# Patient Record
Sex: FEMALE | Race: BLACK OR AFRICAN AMERICAN | Employment: UNEMPLOYED | URBAN - METROPOLITAN AREA
[De-identification: names, ages, dates, MRNs, and addresses within clinical notes are randomized per-mention and may not be internally consistent; named-entity substitution may affect disease eponyms.]

---

## 2018-03-18 ENCOUNTER — APPOINTMENT (OUTPATIENT)
Dept: GENERAL RADIOLOGY | Age: 33
End: 2018-03-18
Payer: MEDICAID

## 2018-03-18 ENCOUNTER — HOSPITAL ENCOUNTER (EMERGENCY)
Age: 33
Discharge: HOME | End: 2018-03-18
Attending: EMERGENCY MEDICINE
Payer: MEDICAID

## 2018-03-18 VITALS
DIASTOLIC BLOOD PRESSURE: 85 MMHG | WEIGHT: 180 LBS | TEMPERATURE: 99.2 F | OXYGEN SATURATION: 96 % | HEIGHT: 66 IN | SYSTOLIC BLOOD PRESSURE: 141 MMHG | RESPIRATION RATE: 18 BRPM | BODY MASS INDEX: 28.93 KG/M2 | HEART RATE: 106 BPM

## 2018-03-18 DIAGNOSIS — J11.1 INFLUENZA WITH RESPIRATORY MANIFESTATION OTHER THAN PNEUMONIA: Primary | ICD-10-CM

## 2018-03-18 LAB
ALBUMIN SERPL-MCNC: 4 G/DL (ref 3.5–5.2)
ALP BLD-CCNC: 63 U/L (ref 35–104)
ALT SERPL-CCNC: 54 U/L (ref 0–32)
ANION GAP SERPL CALCULATED.3IONS-SCNC: 12 MMOL/L (ref 7–16)
AST SERPL-CCNC: 102 U/L (ref 0–31)
BACTERIA: ABNORMAL /HPF
BILIRUB SERPL-MCNC: 0.4 MG/DL (ref 0–1.2)
BILIRUBIN URINE: NEGATIVE
BLOOD, URINE: NEGATIVE
BUN BLDV-MCNC: 6 MG/DL (ref 6–20)
CALCIUM SERPL-MCNC: 8.9 MG/DL (ref 8.6–10.2)
CHLORIDE BLD-SCNC: 98 MMOL/L (ref 98–107)
CHP ED QC CHECK: NORMAL
CLARITY: CLEAR
CO2: 25 MMOL/L (ref 22–29)
COLOR: YELLOW
CREAT SERPL-MCNC: 0.9 MG/DL (ref 0.5–1)
EPITHELIAL CELLS, UA: ABNORMAL /HPF
GFR AFRICAN AMERICAN: >60
GFR NON-AFRICAN AMERICAN: >60 ML/MIN/1.73
GLUCOSE BLD-MCNC: 111 MG/DL (ref 74–109)
GLUCOSE URINE: NEGATIVE MG/DL
HCT VFR BLD CALC: 37.1 % (ref 34–48)
HEMOGLOBIN: 12.1 G/DL (ref 11.5–15.5)
INFLUENZA A BY PCR: NOT DETECTED
INFLUENZA B BY PCR: DETECTED
KETONES, URINE: NEGATIVE MG/DL
LEUKOCYTE ESTERASE, URINE: ABNORMAL
LIPASE: 19 U/L (ref 13–60)
MCH RBC QN AUTO: 27.1 PG (ref 26–35)
MCHC RBC AUTO-ENTMCNC: 32.6 % (ref 32–34.5)
MCV RBC AUTO: 83.2 FL (ref 80–99.9)
NITRITE, URINE: NEGATIVE
PDW BLD-RTO: 18.1 FL (ref 11.5–15)
PH UA: 6.5 (ref 5–9)
PLATELET # BLD: 288 E9/L (ref 130–450)
PMV BLD AUTO: 9 FL (ref 7–12)
POTASSIUM SERPL-SCNC: 4.3 MMOL/L (ref 3.5–5)
PREGNANCY TEST URINE, POC: NEGATIVE
PROTEIN UA: NEGATIVE MG/DL
RBC # BLD: 4.46 E12/L (ref 3.5–5.5)
RBC UA: ABNORMAL /HPF (ref 0–2)
SODIUM BLD-SCNC: 135 MMOL/L (ref 132–146)
SPECIFIC GRAVITY UA: 1.01 (ref 1–1.03)
TOTAL PROTEIN: 7.8 G/DL (ref 6.4–8.3)
UROBILINOGEN, URINE: 1 E.U./DL
WBC # BLD: 3.7 E9/L (ref 4.5–11.5)
WBC UA: ABNORMAL /HPF (ref 0–5)

## 2018-03-18 PROCEDURE — 96374 THER/PROPH/DIAG INJ IV PUSH: CPT

## 2018-03-18 PROCEDURE — 99283 EMERGENCY DEPT VISIT LOW MDM: CPT

## 2018-03-18 PROCEDURE — 83690 ASSAY OF LIPASE: CPT

## 2018-03-18 PROCEDURE — 85027 COMPLETE CBC AUTOMATED: CPT

## 2018-03-18 PROCEDURE — 80053 COMPREHEN METABOLIC PANEL: CPT

## 2018-03-18 PROCEDURE — 81001 URINALYSIS AUTO W/SCOPE: CPT

## 2018-03-18 PROCEDURE — 87088 URINE BACTERIA CULTURE: CPT

## 2018-03-18 PROCEDURE — 6360000002 HC RX W HCPCS: Performed by: EMERGENCY MEDICINE

## 2018-03-18 PROCEDURE — 87502 INFLUENZA DNA AMP PROBE: CPT

## 2018-03-18 PROCEDURE — 2580000003 HC RX 258: Performed by: EMERGENCY MEDICINE

## 2018-03-18 PROCEDURE — 71045 X-RAY EXAM CHEST 1 VIEW: CPT

## 2018-03-18 RX ORDER — 0.9 % SODIUM CHLORIDE 0.9 %
1000 INTRAVENOUS SOLUTION INTRAVENOUS ONCE
Status: COMPLETED | OUTPATIENT
Start: 2018-03-18 | End: 2018-03-18

## 2018-03-18 RX ORDER — KETOROLAC TROMETHAMINE 30 MG/ML
30 INJECTION, SOLUTION INTRAMUSCULAR; INTRAVENOUS ONCE
Status: COMPLETED | OUTPATIENT
Start: 2018-03-18 | End: 2018-03-18

## 2018-03-18 RX ORDER — OSELTAMIVIR PHOSPHATE 75 MG/1
75 CAPSULE ORAL 2 TIMES DAILY
Qty: 10 CAPSULE | Refills: 0 | Status: SHIPPED | OUTPATIENT
Start: 2018-03-18 | End: 2018-03-23

## 2018-03-18 RX ADMIN — SODIUM CHLORIDE 1000 ML: 9 INJECTION, SOLUTION INTRAVENOUS at 10:26

## 2018-03-18 RX ADMIN — KETOROLAC TROMETHAMINE 30 MG: 30 INJECTION, SOLUTION INTRAMUSCULAR; INTRAVENOUS at 12:43

## 2018-03-18 ASSESSMENT — PAIN DESCRIPTION - DESCRIPTORS: DESCRIPTORS: ACHING

## 2018-03-18 ASSESSMENT — PAIN DESCRIPTION - PAIN TYPE: TYPE: ACUTE PAIN

## 2018-03-18 ASSESSMENT — PAIN SCALES - GENERAL
PAINLEVEL_OUTOF10: 3
PAINLEVEL_OUTOF10: 7
PAINLEVEL_OUTOF10: 4

## 2018-03-18 NOTE — ED PROVIDER NOTES
Chloride 98 98 - 107 mmol/L    CO2 25 22 - 29 mmol/L    Anion Gap 12 7 - 16 mmol/L    Glucose 111 (H) 74 - 109 mg/dL    BUN 6 6 - 20 mg/dL    CREATININE 0.9 0.5 - 1.0 mg/dL    GFR Non-African American >60 >=60 mL/min/1.73    GFR African American >60     Calcium 8.9 8.6 - 10.2 mg/dL    Total Protein 7.8 6.4 - 8.3 g/dL    Alb 4.0 3.5 - 5.2 g/dL    Total Bilirubin 0.4 0.0 - 1.2 mg/dL    Alkaline Phosphatase 63 35 - 104 U/L    ALT 54 (H) 0 - 32 U/L     (H) 0 - 31 U/L   Lipase   Result Value Ref Range    Lipase 19 13 - 60 U/L   Microscopic Urinalysis   Result Value Ref Range    WBC, UA 0-1 0 - 5 /HPF    RBC, UA NONE 0 - 2 /HPF    Epi Cells RARE /HPF    Bacteria, UA RARE (A) /HPF   POC Pregnancy Urine Qual   Result Value Ref Range    Preg Test, Ur negative     QC OK? ok        RADIOLOGY:  Interpreted by Radiologist.  XR CHEST PORTABLE   Final Result   Bibasilar atelectasis and/or interstitial edema. Otherwise no acute   process identified. EKG:      ------------------------- NURSING NOTES AND VITALS REVIEWED ---------------------------   The nursing notes within the ED encounter and vital signs as below have been reviewed by myself. BP (!) 141/85   Pulse 106   Temp 99.2 °F (37.3 °C) (Oral)   Resp 18   Ht 5' 6\" (1.676 m)   Wt 180 lb (81.6 kg)   LMP 02/22/2018 (Approximate)   SpO2 96%   BMI 29.05 kg/m²   Oxygen Saturation Interpretation: Normal    The patients available past medical records and past encounters were reviewed. ------------------------------ ED COURSE/MEDICAL DECISION MAKING----------------------  Medications   0.9 % sodium chloride bolus (1,000 mLs Intravenous New Bag 3/18/18 1026)       Medical Decision Making:    Pt presents for cough and generalized body aches. IV fluids given. Labs and imaging obtained, reviewed; results discussed with patient. Patient family updated on all diagnostics. Patient discharged home on Tamiflu for influenza.     This patient's ED course included: a

## 2018-03-20 LAB — URINE CULTURE, ROUTINE: NORMAL

## 2018-04-05 ENCOUNTER — APPOINTMENT (OUTPATIENT)
Dept: GENERAL RADIOLOGY | Age: 33
DRG: 342 | End: 2018-04-05
Payer: MEDICAID

## 2018-04-05 ENCOUNTER — HOSPITAL ENCOUNTER (INPATIENT)
Age: 33
LOS: 1 days | Discharge: HOME | DRG: 342 | End: 2018-04-06
Attending: EMERGENCY MEDICINE | Admitting: SURGERY
Payer: MEDICAID

## 2018-04-05 ENCOUNTER — APPOINTMENT (OUTPATIENT)
Dept: CT IMAGING | Age: 33
DRG: 342 | End: 2018-04-05
Payer: MEDICAID

## 2018-04-05 ENCOUNTER — APPOINTMENT (OUTPATIENT)
Dept: MRI IMAGING | Age: 33
DRG: 342 | End: 2018-04-05
Payer: MEDICAID

## 2018-04-05 DIAGNOSIS — S62.645B NONDISPLACED FRACTURE OF PROXIMAL PHALANX OF LEFT RING FINGER, INITIAL ENCOUNTER FOR OPEN FRACTURE: ICD-10-CM

## 2018-04-05 DIAGNOSIS — S62.665B NONDISPLACED FRACTURE OF DISTAL PHALANX OF LEFT RING FINGER, INITIAL ENCOUNTER FOR OPEN FRACTURE: Primary | ICD-10-CM

## 2018-04-05 DIAGNOSIS — T14.90XA TRAUMA: ICD-10-CM

## 2018-04-05 DIAGNOSIS — S62.663A: ICD-10-CM
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
## 2018-04-05 LAB
ABO/RH: NORMAL
ACETAMINOPHEN LEVEL: <15 MCG/ML (ref 10–30)
ALBUMIN SERPL-MCNC: 3.8 G/DL (ref 3.5–5.2)
ALP BLD-CCNC: 58 U/L (ref 35–104)
ALT SERPL-CCNC: 25 U/L (ref 0–32)
ANION GAP SERPL CALCULATED.3IONS-SCNC: 15 MMOL/L (ref 7–16)
ANTIBODY SCREEN: NORMAL
APTT: 27.6 SEC (ref 24.5–35.1)
AST SERPL-CCNC: 30 U/L (ref 0–31)
B.E.: -3.8 MMOL/L (ref -3–3)
BILIRUB SERPL-MCNC: 0.3 MG/DL (ref 0–1.2)
BUN BLDV-MCNC: 7 MG/DL (ref 6–20)
CALCIUM SERPL-MCNC: 8.7 MG/DL (ref 8.6–10.2)
CHLORIDE BLD-SCNC: 101 MMOL/L (ref 98–107)
CO2: 24 MMOL/L (ref 22–29)
COHB: 0.4 % (ref 0–1.5)
CREAT SERPL-MCNC: 1 MG/DL (ref 0.5–1)
CRITICAL: ABNORMAL
DATE ANALYZED: ABNORMAL
DATE OF COLLECTION: ABNORMAL
ETHANOL: 178 MG/DL (ref 0–0.08)
GFR AFRICAN AMERICAN: >60
GFR NON-AFRICAN AMERICAN: >60 ML/MIN/1.73
GLUCOSE BLD-MCNC: 126 MG/DL (ref 74–109)
GONADOTROPIN, CHORIONIC (HCG) QUANT: <0.1 MIU/ML
HCO3: 20.7 MMOL/L (ref 22–26)
HCT VFR BLD CALC: 34.4 % (ref 34–48)
HEMOGLOBIN: 11.5 G/DL (ref 11.5–15.5)
HHB: 0.2 % (ref 0–5)
INR BLD: 1
LAB: ABNORMAL
LACTIC ACID: 3 MMOL/L (ref 0.5–2.2)
Lab: 26
MCH RBC QN AUTO: 27.4 PG (ref 26–35)
MCHC RBC AUTO-ENTMCNC: 33.4 % (ref 32–34.5)
MCV RBC AUTO: 82.1 FL (ref 80–99.9)
METHB: 0.8 % (ref 0–1.5)
MODE: ABNORMAL
O2 CONTENT: 18.3 ML/DL
O2 SATURATION: 99.8 % (ref 92–98.5)
O2HB: 98.6 % (ref 94–97)
OPERATOR ID: ABNORMAL
PATIENT TEMP: 37 C
PCO2: 36.1 MMHG (ref 35–45)
PDW BLD-RTO: 17.8 FL (ref 11.5–15)
PH BLOOD GAS: 7.38 (ref 7.35–7.45)
PLATELET # BLD: 364 E9/L (ref 130–450)
PMV BLD AUTO: 9.1 FL (ref 7–12)
PO2: 415.5 MMHG (ref 60–100)
POTASSIUM SERPL-SCNC: 3.4 MMOL/L (ref 3.5–5)
POTASSIUM SERPL-SCNC: 3.56 MMOL/L (ref 3.3–5.1)
PROTHROMBIN TIME: 11.4 SEC (ref 9.3–12.4)
RBC # BLD: 4.19 E12/L (ref 3.5–5.5)
SALICYLATE, SERUM: <0.3 MG/DL (ref 0–30)
SODIUM BLD-SCNC: 140 MMOL/L (ref 132–146)
SOURCE, BLOOD GAS: ABNORMAL
THB: 12.4 G/DL (ref 11.5–16.5)
TIME ANALYZED: 33
TOTAL PROTEIN: 7.2 G/DL (ref 6.4–8.3)
TRICYCLIC ANTIDEPRESSANTS SCREEN SERUM: NEGATIVE NG/ML
WBC # BLD: 7 E9/L (ref 4.5–11.5)

## 2018-04-05 PROCEDURE — 6370000000 HC RX 637 (ALT 250 FOR IP): Performed by: NURSE PRACTITIONER

## 2018-04-05 PROCEDURE — 80307 DRUG TEST PRSMV CHEM ANLYZR: CPT

## 2018-04-05 PROCEDURE — 72170 X-RAY EXAM OF PELVIS: CPT

## 2018-04-05 PROCEDURE — 85027 COMPLETE CBC AUTOMATED: CPT

## 2018-04-05 PROCEDURE — 99285 EMERGENCY DEPT VISIT HI MDM: CPT

## 2018-04-05 PROCEDURE — 94150 VITAL CAPACITY TEST: CPT

## 2018-04-05 PROCEDURE — 86850 RBC ANTIBODY SCREEN: CPT

## 2018-04-05 PROCEDURE — 71045 X-RAY EXAM CHEST 1 VIEW: CPT

## 2018-04-05 PROCEDURE — G0390 TRAUMA RESPONS W/HOSP CRITI: HCPCS

## 2018-04-05 PROCEDURE — 71260 CT THORAX DX C+: CPT

## 2018-04-05 PROCEDURE — 73120 X-RAY EXAM OF HAND: CPT

## 2018-04-05 PROCEDURE — 84132 ASSAY OF SERUM POTASSIUM: CPT

## 2018-04-05 PROCEDURE — 96375 TX/PRO/DX INJ NEW DRUG ADDON: CPT

## 2018-04-05 PROCEDURE — 2580000003 HC RX 258: Performed by: STUDENT IN AN ORGANIZED HEALTH CARE EDUCATION/TRAINING PROGRAM

## 2018-04-05 PROCEDURE — 73090 X-RAY EXAM OF FOREARM: CPT

## 2018-04-05 PROCEDURE — 72128 CT CHEST SPINE W/O DYE: CPT

## 2018-04-05 PROCEDURE — 70450 CT HEAD/BRAIN W/O DYE: CPT

## 2018-04-05 PROCEDURE — 80053 COMPREHEN METABOLIC PANEL: CPT

## 2018-04-05 PROCEDURE — 86900 BLOOD TYPING SEROLOGIC ABO: CPT

## 2018-04-05 PROCEDURE — 73130 X-RAY EXAM OF HAND: CPT

## 2018-04-05 PROCEDURE — 6360000002 HC RX W HCPCS: Performed by: STUDENT IN AN ORGANIZED HEALTH CARE EDUCATION/TRAINING PROGRAM

## 2018-04-05 PROCEDURE — 2580000003 HC RX 258: Performed by: RADIOLOGY

## 2018-04-05 PROCEDURE — 1200000000 HC SEMI PRIVATE

## 2018-04-05 PROCEDURE — 74177 CT ABD & PELVIS W/CONTRAST: CPT

## 2018-04-05 PROCEDURE — 84702 CHORIONIC GONADOTROPIN TEST: CPT

## 2018-04-05 PROCEDURE — 96376 TX/PRO/DX INJ SAME DRUG ADON: CPT

## 2018-04-05 PROCEDURE — 85730 THROMBOPLASTIN TIME PARTIAL: CPT

## 2018-04-05 PROCEDURE — 83605 ASSAY OF LACTIC ACID: CPT

## 2018-04-05 PROCEDURE — 86901 BLOOD TYPING SEROLOGIC RH(D): CPT

## 2018-04-05 PROCEDURE — G0378 HOSPITAL OBSERVATION PER HR: HCPCS

## 2018-04-05 PROCEDURE — 73100 X-RAY EXAM OF WRIST: CPT

## 2018-04-05 PROCEDURE — 36415 COLL VENOUS BLD VENIPUNCTURE: CPT

## 2018-04-05 PROCEDURE — 72125 CT NECK SPINE W/O DYE: CPT

## 2018-04-05 PROCEDURE — 82805 BLOOD GASES W/O2 SATURATION: CPT

## 2018-04-05 PROCEDURE — 6370000000 HC RX 637 (ALT 250 FOR IP): Performed by: STUDENT IN AN ORGANIZED HEALTH CARE EDUCATION/TRAINING PROGRAM

## 2018-04-05 PROCEDURE — 85610 PROTHROMBIN TIME: CPT

## 2018-04-05 PROCEDURE — 6360000004 HC RX CONTRAST MEDICATION: Performed by: RADIOLOGY

## 2018-04-05 PROCEDURE — 96374 THER/PROPH/DIAG INJ IV PUSH: CPT

## 2018-04-05 PROCEDURE — 73070 X-RAY EXAM OF ELBOW: CPT

## 2018-04-05 RX ORDER — METHOCARBAMOL 500 MG/1
1000 TABLET, FILM COATED ORAL 4 TIMES DAILY
Status: DISCONTINUED | OUTPATIENT
Start: 2018-04-05 | End: 2018-04-06

## 2018-04-05 RX ORDER — ACETAMINOPHEN 325 MG/1
650 TABLET ORAL EVERY 4 HOURS PRN
Status: DISCONTINUED | OUTPATIENT
Start: 2018-04-05 | End: 2018-04-06 | Stop reason: HOSPADM

## 2018-04-05 RX ORDER — ONDANSETRON 2 MG/ML
4 INJECTION INTRAMUSCULAR; INTRAVENOUS EVERY 6 HOURS PRN
Status: DISCONTINUED | OUTPATIENT
Start: 2018-04-05 | End: 2018-04-06 | Stop reason: HOSPADM

## 2018-04-05 RX ORDER — SODIUM CHLORIDE 9 MG/ML
INJECTION, SOLUTION INTRAVENOUS CONTINUOUS
Status: DISCONTINUED | OUTPATIENT
Start: 2018-04-05 | End: 2018-04-05

## 2018-04-05 RX ORDER — MORPHINE SULFATE 2 MG/ML
2 INJECTION, SOLUTION INTRAMUSCULAR; INTRAVENOUS
Status: DISCONTINUED | OUTPATIENT
Start: 2018-04-05 | End: 2018-04-05

## 2018-04-05 RX ORDER — SODIUM CHLORIDE 0.9 % (FLUSH) 0.9 %
10 SYRINGE (ML) INJECTION EVERY 12 HOURS SCHEDULED
Status: DISCONTINUED | OUTPATIENT
Start: 2018-04-05 | End: 2018-04-06 | Stop reason: HOSPADM

## 2018-04-05 RX ORDER — SODIUM CHLORIDE 0.9 % (FLUSH) 0.9 %
10 SYRINGE (ML) INJECTION PRN
Status: COMPLETED | OUTPATIENT
Start: 2018-04-05 | End: 2018-04-05

## 2018-04-05 RX ORDER — LORAZEPAM 2 MG/ML
1 INJECTION INTRAMUSCULAR ONCE
Status: DISCONTINUED | OUTPATIENT
Start: 2018-04-05 | End: 2018-04-06 | Stop reason: HOSPADM

## 2018-04-05 RX ORDER — ONDANSETRON 2 MG/ML
INJECTION INTRAMUSCULAR; INTRAVENOUS DAILY PRN
Status: DISCONTINUED | OUTPATIENT
Start: 2018-04-05 | End: 2018-04-06 | Stop reason: HOSPADM

## 2018-04-05 RX ORDER — FENTANYL CITRATE 50 UG/ML
INJECTION, SOLUTION INTRAMUSCULAR; INTRAVENOUS DAILY PRN
Status: DISCONTINUED | OUTPATIENT
Start: 2018-04-05 | End: 2018-04-06 | Stop reason: HOSPADM

## 2018-04-05 RX ORDER — SODIUM CHLORIDE 0.9 % (FLUSH) 0.9 %
10 SYRINGE (ML) INJECTION PRN
Status: DISCONTINUED | OUTPATIENT
Start: 2018-04-05 | End: 2018-04-06 | Stop reason: HOSPADM

## 2018-04-05 RX ORDER — OXYCODONE HYDROCHLORIDE 5 MG/1
5 TABLET ORAL EVERY 4 HOURS PRN
Status: DISCONTINUED | OUTPATIENT
Start: 2018-04-05 | End: 2018-04-06

## 2018-04-05 RX ADMIN — IOPAMIDOL 110 ML: 755 INJECTION, SOLUTION INTRAVENOUS at 01:38

## 2018-04-05 RX ADMIN — METHOCARBAMOL 1000 MG: 500 TABLET ORAL at 16:02

## 2018-04-05 RX ADMIN — MORPHINE SULFATE 2 MG: 2 INJECTION, SOLUTION INTRAMUSCULAR; INTRAVENOUS at 04:23

## 2018-04-05 RX ADMIN — SODIUM CHLORIDE: 9 INJECTION, SOLUTION INTRAVENOUS at 02:10

## 2018-04-05 RX ADMIN — OXYCODONE HYDROCHLORIDE 5 MG: 5 TABLET ORAL at 10:24

## 2018-04-05 RX ADMIN — Medication 10 ML: at 20:14

## 2018-04-05 RX ADMIN — SODIUM CHLORIDE: 9 INJECTION, SOLUTION INTRAVENOUS at 10:01

## 2018-04-05 RX ADMIN — METHOCARBAMOL 1000 MG: 500 TABLET ORAL at 20:14

## 2018-04-05 RX ADMIN — ONDANSETRON 4 MG: 2 INJECTION, SOLUTION INTRAMUSCULAR; INTRAVENOUS at 00:28

## 2018-04-05 RX ADMIN — Medication 10 ML: at 01:38

## 2018-04-05 RX ADMIN — OXYCODONE HYDROCHLORIDE 5 MG: 5 TABLET ORAL at 20:13

## 2018-04-05 RX ADMIN — MORPHINE SULFATE 2 MG: 2 INJECTION, SOLUTION INTRAMUSCULAR; INTRAVENOUS at 07:52

## 2018-04-05 RX ADMIN — FENTANYL CITRATE 50 MCG: 50 INJECTION, SOLUTION INTRAMUSCULAR; INTRAVENOUS at 00:28

## 2018-04-05 RX ADMIN — OXYCODONE HYDROCHLORIDE 5 MG: 5 TABLET ORAL at 16:01

## 2018-04-05 ASSESSMENT — PAIN DESCRIPTION - PAIN TYPE
TYPE: ACUTE PAIN

## 2018-04-05 ASSESSMENT — PAIN SCALES - GENERAL
PAINLEVEL_OUTOF10: 8
PAINLEVEL_OUTOF10: 0
PAINLEVEL_OUTOF10: 8
PAINLEVEL_OUTOF10: 8
PAINLEVEL_OUTOF10: 7
PAINLEVEL_OUTOF10: 9
PAINLEVEL_OUTOF10: 9
PAINLEVEL_OUTOF10: 10
PAINLEVEL_OUTOF10: 8

## 2018-04-05 ASSESSMENT — ACTIVITIES OF DAILY LIVING (ADL)
EFFECT OF PAIN ON DAILY ACTIVITIES: DISCOMFORT
EFFECT OF PAIN ON DAILY ACTIVITIES: DISCOMFORT

## 2018-04-05 ASSESSMENT — PAIN DESCRIPTION - FREQUENCY
FREQUENCY: CONTINUOUS
FREQUENCY: CONTINUOUS

## 2018-04-05 ASSESSMENT — PAIN DESCRIPTION - ORIENTATION
ORIENTATION: LEFT

## 2018-04-05 ASSESSMENT — PAIN DESCRIPTION - ONSET
ONSET: ON-GOING
ONSET: ON-GOING

## 2018-04-05 ASSESSMENT — PAIN DESCRIPTION - DESCRIPTORS
DESCRIPTORS: THROBBING;DISCOMFORT;CONSTANT
DESCRIPTORS: SHARP;SHOOTING;DISCOMFORT
DESCRIPTORS: ACHING;CONSTANT;DISCOMFORT
DESCRIPTORS: SHARP;CONSTANT;DISCOMFORT
DESCRIPTORS: ACHING;CONSTANT;DISCOMFORT

## 2018-04-05 ASSESSMENT — PAIN DESCRIPTION - PROGRESSION
CLINICAL_PROGRESSION: GRADUALLY IMPROVING
CLINICAL_PROGRESSION: NOT CHANGED
CLINICAL_PROGRESSION: NOT CHANGED

## 2018-04-05 ASSESSMENT — PAIN DESCRIPTION - LOCATION
LOCATION: HAND
LOCATION: HAND;WRIST
LOCATION: HAND
LOCATION: HAND;WRIST
LOCATION: HAND
LOCATION: HAND

## 2018-04-06 ENCOUNTER — TELEPHONE (OUTPATIENT)
Dept: SURGERY | Age: 33
End: 2018-04-06

## 2018-04-06 ENCOUNTER — TELEPHONE (OUTPATIENT)
Dept: ORTHOPEDIC SURGERY | Age: 33
End: 2018-04-06

## 2018-04-06 VITALS
RESPIRATION RATE: 18 BRPM | SYSTOLIC BLOOD PRESSURE: 119 MMHG | HEART RATE: 93 BPM | BODY MASS INDEX: 32.14 KG/M2 | WEIGHT: 200 LBS | TEMPERATURE: 98.2 F | HEIGHT: 66 IN | DIASTOLIC BLOOD PRESSURE: 56 MMHG | OXYGEN SATURATION: 97 %

## 2018-04-06 DIAGNOSIS — M79.642 LEFT HAND PAIN: Primary | ICD-10-CM

## 2018-04-06 PROBLEM — S62.665B: Status: ACTIVE | Noted: 2018-04-06

## 2018-04-06 PROBLEM — S62.663A: Status: ACTIVE | Noted: 2018-04-06

## 2018-04-06 PROBLEM — S62.645B: Status: ACTIVE | Noted: 2018-04-06

## 2018-04-06 LAB
ALBUMIN SERPL-MCNC: 3.5 G/DL (ref 3.5–5.2)
ALP BLD-CCNC: 53 U/L (ref 35–104)
ALT SERPL-CCNC: 23 U/L (ref 0–32)
ANION GAP SERPL CALCULATED.3IONS-SCNC: 16 MMOL/L (ref 7–16)
AST SERPL-CCNC: 22 U/L (ref 0–31)
BASOPHILS ABSOLUTE: 0.03 E9/L (ref 0–0.2)
BASOPHILS RELATIVE PERCENT: 0.5 % (ref 0–2)
BILIRUB SERPL-MCNC: 0.4 MG/DL (ref 0–1.2)
BUN BLDV-MCNC: 6 MG/DL (ref 6–20)
CALCIUM SERPL-MCNC: 8.9 MG/DL (ref 8.6–10.2)
CHLORIDE BLD-SCNC: 99 MMOL/L (ref 98–107)
CO2: 24 MMOL/L (ref 22–29)
CREAT SERPL-MCNC: 0.9 MG/DL (ref 0.5–1)
EOSINOPHILS ABSOLUTE: 0.06 E9/L (ref 0.05–0.5)
EOSINOPHILS RELATIVE PERCENT: 1 % (ref 0–6)
GFR AFRICAN AMERICAN: >60
GFR NON-AFRICAN AMERICAN: >60 ML/MIN/1.73
GLUCOSE BLD-MCNC: 100 MG/DL (ref 74–109)
HCT VFR BLD CALC: 33.1 % (ref 34–48)
HEMOGLOBIN: 10.7 G/DL (ref 11.5–15.5)
IMMATURE GRANULOCYTES #: 0.01 E9/L
IMMATURE GRANULOCYTES %: 0.2 % (ref 0–5)
LYMPHOCYTES ABSOLUTE: 2.33 E9/L (ref 1.5–4)
LYMPHOCYTES RELATIVE PERCENT: 38.6 % (ref 20–42)
MCH RBC QN AUTO: 27.2 PG (ref 26–35)
MCHC RBC AUTO-ENTMCNC: 32.3 % (ref 32–34.5)
MCV RBC AUTO: 84.2 FL (ref 80–99.9)
MONOCYTES ABSOLUTE: 0.47 E9/L (ref 0.1–0.95)
MONOCYTES RELATIVE PERCENT: 7.8 % (ref 2–12)
NEUTROPHILS ABSOLUTE: 3.13 E9/L (ref 1.8–7.3)
NEUTROPHILS RELATIVE PERCENT: 51.9 % (ref 43–80)
PDW BLD-RTO: 17.6 FL (ref 11.5–15)
PLATELET # BLD: 322 E9/L (ref 130–450)
PMV BLD AUTO: 9.3 FL (ref 7–12)
POTASSIUM REFLEX MAGNESIUM: 3.7 MMOL/L (ref 3.5–5)
RBC # BLD: 3.93 E12/L (ref 3.5–5.5)
SODIUM BLD-SCNC: 139 MMOL/L (ref 132–146)
TOTAL PROTEIN: 6.6 G/DL (ref 6.4–8.3)
WBC # BLD: 6 E9/L (ref 4.5–11.5)

## 2018-04-06 PROCEDURE — 6370000000 HC RX 637 (ALT 250 FOR IP): Performed by: STUDENT IN AN ORGANIZED HEALTH CARE EDUCATION/TRAINING PROGRAM

## 2018-04-06 PROCEDURE — APPSS30 APP SPLIT SHARED TIME 16-30 MINUTES: Performed by: NURSE PRACTITIONER

## 2018-04-06 PROCEDURE — 2580000003 HC RX 258: Performed by: STUDENT IN AN ORGANIZED HEALTH CARE EDUCATION/TRAINING PROGRAM

## 2018-04-06 PROCEDURE — 36415 COLL VENOUS BLD VENIPUNCTURE: CPT

## 2018-04-06 PROCEDURE — G0378 HOSPITAL OBSERVATION PER HR: HCPCS

## 2018-04-06 PROCEDURE — 85025 COMPLETE CBC W/AUTO DIFF WBC: CPT

## 2018-04-06 PROCEDURE — 99238 HOSP IP/OBS DSCHRG MGMT 30/<: CPT | Performed by: SURGERY

## 2018-04-06 PROCEDURE — 6370000000 HC RX 637 (ALT 250 FOR IP): Performed by: NURSE PRACTITIONER

## 2018-04-06 PROCEDURE — 80053 COMPREHEN METABOLIC PANEL: CPT

## 2018-04-06 RX ORDER — METHOCARBAMOL 750 MG/1
1500 TABLET, FILM COATED ORAL 4 TIMES DAILY
Qty: 32 TABLET | Refills: 0 | Status: SHIPPED | OUTPATIENT
Start: 2018-04-06 | End: 2018-04-10

## 2018-04-06 RX ORDER — HYDROCODONE BITARTRATE AND ACETAMINOPHEN 5; 325 MG/1; MG/1
1 TABLET ORAL EVERY 6 HOURS PRN
Status: DISCONTINUED | OUTPATIENT
Start: 2018-04-06 | End: 2018-04-06 | Stop reason: HOSPADM

## 2018-04-06 RX ORDER — HYDROCODONE BITARTRATE AND ACETAMINOPHEN 5; 325 MG/1; MG/1
1 TABLET ORAL EVERY 6 HOURS PRN
Qty: 20 TABLET | Refills: 0 | Status: ON HOLD | OUTPATIENT
Start: 2018-04-06 | End: 2018-04-10

## 2018-04-06 RX ORDER — HYDROCODONE BITARTRATE AND ACETAMINOPHEN 10; 325 MG/1; MG/1
1 TABLET ORAL EVERY 6 HOURS PRN
Status: ON HOLD | COMMUNITY
End: 2018-04-10 | Stop reason: HOSPADM

## 2018-04-06 RX ORDER — METHOCARBAMOL 750 MG/1
1500 TABLET, FILM COATED ORAL 4 TIMES DAILY
Status: DISCONTINUED | OUTPATIENT
Start: 2018-04-06 | End: 2018-04-06 | Stop reason: HOSPADM

## 2018-04-06 RX ADMIN — METHOCARBAMOL 1500 MG: 500 TABLET ORAL at 08:34

## 2018-04-06 RX ADMIN — HYDROCODONE BITARTRATE AND ACETAMINOPHEN 1 TABLET: 5; 325 TABLET ORAL at 07:41

## 2018-04-06 RX ADMIN — Medication 10 ML: at 08:35

## 2018-04-06 RX ADMIN — OXYCODONE HYDROCHLORIDE 5 MG: 5 TABLET ORAL at 00:34

## 2018-04-06 ASSESSMENT — PAIN SCALES - GENERAL
PAINLEVEL_OUTOF10: 9
PAINLEVEL_OUTOF10: 9

## 2018-04-09 ENCOUNTER — ANESTHESIA EVENT (OUTPATIENT)
Dept: OPERATING ROOM | Age: 33
End: 2018-04-09
Payer: MEDICAID

## 2018-04-09 ENCOUNTER — PREP FOR PROCEDURE (OUTPATIENT)
Dept: ORTHOPEDIC SURGERY | Age: 33
End: 2018-04-09

## 2018-04-09 RX ORDER — SODIUM CHLORIDE 9 MG/ML
INJECTION, SOLUTION INTRAVENOUS CONTINUOUS
Status: CANCELLED | OUTPATIENT
Start: 2018-04-09

## 2018-04-09 RX ORDER — SODIUM CHLORIDE 0.9 % (FLUSH) 0.9 %
10 SYRINGE (ML) INJECTION EVERY 12 HOURS SCHEDULED
Status: CANCELLED | OUTPATIENT
Start: 2018-04-09

## 2018-04-09 RX ORDER — SODIUM CHLORIDE 0.9 % (FLUSH) 0.9 %
10 SYRINGE (ML) INJECTION PRN
Status: CANCELLED | OUTPATIENT
Start: 2018-04-09

## 2018-04-10 ENCOUNTER — ANESTHESIA (OUTPATIENT)
Dept: OPERATING ROOM | Age: 33
End: 2018-04-10
Payer: MEDICAID

## 2018-04-10 ENCOUNTER — APPOINTMENT (OUTPATIENT)
Dept: GENERAL RADIOLOGY | Age: 33
End: 2018-04-10
Attending: ORTHOPAEDIC SURGERY
Payer: MEDICAID

## 2018-04-10 ENCOUNTER — HOSPITAL ENCOUNTER (OUTPATIENT)
Age: 33
Setting detail: OUTPATIENT SURGERY
Discharge: HOME | End: 2018-04-10
Attending: ORTHOPAEDIC SURGERY | Admitting: ORTHOPAEDIC SURGERY
Payer: MEDICAID

## 2018-04-10 VITALS
DIASTOLIC BLOOD PRESSURE: 81 MMHG | TEMPERATURE: 96.8 F | RESPIRATION RATE: 16 BRPM | WEIGHT: 200 LBS | OXYGEN SATURATION: 96 % | SYSTOLIC BLOOD PRESSURE: 137 MMHG | BODY MASS INDEX: 32.28 KG/M2 | HEART RATE: 80 BPM

## 2018-04-10 VITALS — TEMPERATURE: 96.6 F | OXYGEN SATURATION: 100 % | DIASTOLIC BLOOD PRESSURE: 71 MMHG | SYSTOLIC BLOOD PRESSURE: 124 MMHG

## 2018-04-10 DIAGNOSIS — S62.663A: ICD-10-CM

## 2018-04-10 DIAGNOSIS — S62.665B NONDISPLACED FRACTURE OF DISTAL PHALANX OF LEFT RING FINGER, INITIAL ENCOUNTER FOR OPEN FRACTURE: ICD-10-CM

## 2018-04-10 DIAGNOSIS — S62.645B NONDISPLACED FRACTURE OF PROXIMAL PHALANX OF LEFT RING FINGER, INITIAL ENCOUNTER FOR OPEN FRACTURE: ICD-10-CM

## 2018-04-10 LAB — HCG(URINE) PREGNANCY TEST: NEGATIVE

## 2018-04-10 PROCEDURE — 2500000003 HC RX 250 WO HCPCS: Performed by: ORTHOPAEDIC SURGERY

## 2018-04-10 PROCEDURE — C1769 GUIDE WIRE: HCPCS | Performed by: ORTHOPAEDIC SURGERY

## 2018-04-10 PROCEDURE — 3600000012 HC SURGERY LEVEL 2 ADDTL 15MIN: Performed by: ORTHOPAEDIC SURGERY

## 2018-04-10 PROCEDURE — 6370000000 HC RX 637 (ALT 250 FOR IP): Performed by: ANESTHESIOLOGY

## 2018-04-10 PROCEDURE — 7100000010 HC PHASE II RECOVERY - FIRST 15 MIN: Performed by: ORTHOPAEDIC SURGERY

## 2018-04-10 PROCEDURE — C1713 ANCHOR/SCREW BN/BN,TIS/BN: HCPCS | Performed by: ORTHOPAEDIC SURGERY

## 2018-04-10 PROCEDURE — 26727 TREAT FINGER FRACTURE EACH: CPT | Performed by: ORTHOPAEDIC SURGERY

## 2018-04-10 PROCEDURE — 7100000001 HC PACU RECOVERY - ADDTL 15 MIN: Performed by: ORTHOPAEDIC SURGERY

## 2018-04-10 PROCEDURE — 26735 TREAT FINGER FRACTURE EACH: CPT | Performed by: ORTHOPAEDIC SURGERY

## 2018-04-10 PROCEDURE — 2580000003 HC RX 258: Performed by: PHYSICIAN ASSISTANT

## 2018-04-10 PROCEDURE — 6360000002 HC RX W HCPCS: Performed by: NURSE ANESTHETIST, CERTIFIED REGISTERED

## 2018-04-10 PROCEDURE — A4565 SLINGS: HCPCS | Performed by: ORTHOPAEDIC SURGERY

## 2018-04-10 PROCEDURE — 6360000002 HC RX W HCPCS: Performed by: ANESTHESIOLOGY

## 2018-04-10 PROCEDURE — 7100000011 HC PHASE II RECOVERY - ADDTL 15 MIN: Performed by: ORTHOPAEDIC SURGERY

## 2018-04-10 PROCEDURE — 3700000000 HC ANESTHESIA ATTENDED CARE: Performed by: ORTHOPAEDIC SURGERY

## 2018-04-10 PROCEDURE — 7100000000 HC PACU RECOVERY - FIRST 15 MIN: Performed by: ORTHOPAEDIC SURGERY

## 2018-04-10 PROCEDURE — 2500000003 HC RX 250 WO HCPCS: Performed by: NURSE ANESTHETIST, CERTIFIED REGISTERED

## 2018-04-10 PROCEDURE — 81025 URINE PREGNANCY TEST: CPT

## 2018-04-10 PROCEDURE — 2709999900 HC NON-CHARGEABLE SUPPLY: Performed by: ORTHOPAEDIC SURGERY

## 2018-04-10 PROCEDURE — 3600000002 HC SURGERY LEVEL 2 BASE: Performed by: ORTHOPAEDIC SURGERY

## 2018-04-10 PROCEDURE — 6370000000 HC RX 637 (ALT 250 FOR IP): Performed by: ORTHOPAEDIC SURGERY

## 2018-04-10 PROCEDURE — 76000 FLUOROSCOPY <1 HR PHYS/QHP: CPT

## 2018-04-10 PROCEDURE — 3700000001 HC ADD 15 MINUTES (ANESTHESIA): Performed by: ORTHOPAEDIC SURGERY

## 2018-04-10 PROCEDURE — 2720000010 HC SURG SUPPLY STERILE: Performed by: ORTHOPAEDIC SURGERY

## 2018-04-10 PROCEDURE — 6360000002 HC RX W HCPCS: Performed by: PHYSICIAN ASSISTANT

## 2018-04-10 PROCEDURE — L3933 FO W/O JOINTS CF: HCPCS | Performed by: ORTHOPAEDIC SURGERY

## 2018-04-10 DEVICE — IMPLANTABLE DEVICE: Type: IMPLANTABLE DEVICE | Status: FUNCTIONAL

## 2018-04-10 DEVICE — K WIRE FIX L6IN DIA0.045IN 1600645] MICROAIRE SURGICAL INSTRUMENTS INC]: Type: IMPLANTABLE DEVICE | Status: FUNCTIONAL

## 2018-04-10 RX ORDER — GINSENG 100 MG
CAPSULE ORAL PRN
Status: DISCONTINUED | OUTPATIENT
Start: 2018-04-10 | End: 2018-04-10 | Stop reason: HOSPADM

## 2018-04-10 RX ORDER — FENTANYL CITRATE 50 UG/ML
50 INJECTION, SOLUTION INTRAMUSCULAR; INTRAVENOUS EVERY 5 MIN PRN
Status: DISCONTINUED | OUTPATIENT
Start: 2018-04-10 | End: 2018-04-10 | Stop reason: HOSPADM

## 2018-04-10 RX ORDER — MEPERIDINE HYDROCHLORIDE 25 MG/ML
12.5 INJECTION INTRAMUSCULAR; INTRAVENOUS; SUBCUTANEOUS EVERY 5 MIN PRN
Status: DISCONTINUED | OUTPATIENT
Start: 2018-04-10 | End: 2018-04-10 | Stop reason: HOSPADM

## 2018-04-10 RX ORDER — DEXAMETHASONE SODIUM PHOSPHATE 4 MG/ML
INJECTION, SOLUTION INTRA-ARTICULAR; INTRALESIONAL; INTRAMUSCULAR; INTRAVENOUS; SOFT TISSUE PRN
Status: DISCONTINUED | OUTPATIENT
Start: 2018-04-10 | End: 2018-04-10 | Stop reason: SDUPTHER

## 2018-04-10 RX ORDER — FENTANYL CITRATE 50 UG/ML
25 INJECTION, SOLUTION INTRAMUSCULAR; INTRAVENOUS EVERY 5 MIN PRN
Status: DISCONTINUED | OUTPATIENT
Start: 2018-04-10 | End: 2018-04-10 | Stop reason: HOSPADM

## 2018-04-10 RX ORDER — DIPHENHYDRAMINE HYDROCHLORIDE 50 MG/ML
12.5 INJECTION INTRAMUSCULAR; INTRAVENOUS
Status: DISCONTINUED | OUTPATIENT
Start: 2018-04-10 | End: 2018-04-10 | Stop reason: HOSPADM

## 2018-04-10 RX ORDER — LIDOCAINE HYDROCHLORIDE 20 MG/ML
INJECTION, SOLUTION INFILTRATION; PERINEURAL PRN
Status: DISCONTINUED | OUTPATIENT
Start: 2018-04-10 | End: 2018-04-10 | Stop reason: SDUPTHER

## 2018-04-10 RX ORDER — GLYCOPYRROLATE 1 MG/5 ML
SYRINGE (ML) INTRAVENOUS PRN
Status: DISCONTINUED | OUTPATIENT
Start: 2018-04-10 | End: 2018-04-10 | Stop reason: SDUPTHER

## 2018-04-10 RX ORDER — ONDANSETRON 2 MG/ML
INJECTION INTRAMUSCULAR; INTRAVENOUS PRN
Status: DISCONTINUED | OUTPATIENT
Start: 2018-04-10 | End: 2018-04-10 | Stop reason: SDUPTHER

## 2018-04-10 RX ORDER — SODIUM CHLORIDE 0.9 % (FLUSH) 0.9 %
10 SYRINGE (ML) INJECTION EVERY 12 HOURS SCHEDULED
Status: DISCONTINUED | OUTPATIENT
Start: 2018-04-10 | End: 2018-04-10 | Stop reason: HOSPADM

## 2018-04-10 RX ORDER — SODIUM CHLORIDE 0.9 % (FLUSH) 0.9 %
10 SYRINGE (ML) INJECTION PRN
Status: DISCONTINUED | OUTPATIENT
Start: 2018-04-10 | End: 2018-04-10 | Stop reason: HOSPADM

## 2018-04-10 RX ORDER — SODIUM CHLORIDE 9 MG/ML
INJECTION, SOLUTION INTRAVENOUS CONTINUOUS
Status: DISCONTINUED | OUTPATIENT
Start: 2018-04-10 | End: 2018-04-10 | Stop reason: HOSPADM

## 2018-04-10 RX ORDER — OXYCODONE HYDROCHLORIDE AND ACETAMINOPHEN 5; 325 MG/1; MG/1
1 TABLET ORAL
Status: COMPLETED | OUTPATIENT
Start: 2018-04-10 | End: 2018-04-10

## 2018-04-10 RX ORDER — HYDROCODONE BITARTRATE AND ACETAMINOPHEN 5; 325 MG/1; MG/1
1 TABLET ORAL EVERY 6 HOURS PRN
Qty: 28 TABLET | Refills: 0 | Status: SHIPPED | OUTPATIENT
Start: 2018-04-10 | End: 2018-04-12

## 2018-04-10 RX ORDER — PROPOFOL 10 MG/ML
INJECTION, EMULSION INTRAVENOUS PRN
Status: DISCONTINUED | OUTPATIENT
Start: 2018-04-10 | End: 2018-04-10 | Stop reason: SDUPTHER

## 2018-04-10 RX ORDER — SULFAMETHOXAZOLE AND TRIMETHOPRIM 800; 160 MG/1; MG/1
1 TABLET ORAL 2 TIMES DAILY
Qty: 20 TABLET | Refills: 0 | Status: SHIPPED | OUTPATIENT
Start: 2018-04-10 | End: 2018-04-20

## 2018-04-10 RX ORDER — PROMETHAZINE HYDROCHLORIDE 25 MG/ML
6.25 INJECTION, SOLUTION INTRAMUSCULAR; INTRAVENOUS
Status: DISCONTINUED | OUTPATIENT
Start: 2018-04-10 | End: 2018-04-10 | Stop reason: HOSPADM

## 2018-04-10 RX ORDER — BUPIVACAINE HYDROCHLORIDE 5 MG/ML
INJECTION, SOLUTION EPIDURAL; INTRACAUDAL PRN
Status: DISCONTINUED | OUTPATIENT
Start: 2018-04-10 | End: 2018-04-10 | Stop reason: HOSPADM

## 2018-04-10 RX ORDER — FENTANYL CITRATE 50 UG/ML
INJECTION, SOLUTION INTRAMUSCULAR; INTRAVENOUS PRN
Status: DISCONTINUED | OUTPATIENT
Start: 2018-04-10 | End: 2018-04-10 | Stop reason: SDUPTHER

## 2018-04-10 RX ORDER — MIDAZOLAM HYDROCHLORIDE 1 MG/ML
INJECTION INTRAMUSCULAR; INTRAVENOUS PRN
Status: DISCONTINUED | OUTPATIENT
Start: 2018-04-10 | End: 2018-04-10 | Stop reason: SDUPTHER

## 2018-04-10 RX ADMIN — PROPOFOL 200 MG: 10 INJECTION, EMULSION INTRAVENOUS at 10:44

## 2018-04-10 RX ADMIN — OXYCODONE HYDROCHLORIDE AND ACETAMINOPHEN 1 TABLET: 5; 325 TABLET ORAL at 13:27

## 2018-04-10 RX ADMIN — LIDOCAINE HYDROCHLORIDE 100 MG: 20 INJECTION, SOLUTION INFILTRATION; PERINEURAL at 10:44

## 2018-04-10 RX ADMIN — HYDROMORPHONE HYDROCHLORIDE 0.5 MG: 1 INJECTION, SOLUTION INTRAMUSCULAR; INTRAVENOUS; SUBCUTANEOUS at 12:33

## 2018-04-10 RX ADMIN — FENTANYL CITRATE 50 MCG: 50 INJECTION, SOLUTION INTRAMUSCULAR; INTRAVENOUS at 10:44

## 2018-04-10 RX ADMIN — FENTANYL CITRATE 50 MCG: 50 INJECTION, SOLUTION INTRAMUSCULAR; INTRAVENOUS at 10:47

## 2018-04-10 RX ADMIN — MIDAZOLAM HYDROCHLORIDE 2 MG: 1 INJECTION, SOLUTION INTRAMUSCULAR; INTRAVENOUS at 10:37

## 2018-04-10 RX ADMIN — FENTANYL CITRATE 50 MCG: 50 INJECTION, SOLUTION INTRAMUSCULAR; INTRAVENOUS at 11:42

## 2018-04-10 RX ADMIN — SODIUM CHLORIDE: 9 INJECTION, SOLUTION INTRAVENOUS at 10:37

## 2018-04-10 RX ADMIN — HYDROMORPHONE HYDROCHLORIDE 0.5 MG: 1 INJECTION, SOLUTION INTRAMUSCULAR; INTRAVENOUS; SUBCUTANEOUS at 12:11

## 2018-04-10 RX ADMIN — HYDROMORPHONE HYDROCHLORIDE 0.5 MG: 1 INJECTION, SOLUTION INTRAMUSCULAR; INTRAVENOUS; SUBCUTANEOUS at 12:51

## 2018-04-10 RX ADMIN — HYDROMORPHONE HYDROCHLORIDE 0.5 MG: 1 INJECTION, SOLUTION INTRAMUSCULAR; INTRAVENOUS; SUBCUTANEOUS at 12:39

## 2018-04-10 RX ADMIN — CEFAZOLIN SODIUM 2 G: 2 SOLUTION INTRAVENOUS at 10:48

## 2018-04-10 RX ADMIN — FENTANYL CITRATE 50 MCG: 50 INJECTION, SOLUTION INTRAMUSCULAR; INTRAVENOUS at 10:54

## 2018-04-10 RX ADMIN — Medication 0.2 MG: at 10:44

## 2018-04-10 RX ADMIN — FENTANYL CITRATE 50 MCG: 50 INJECTION, SOLUTION INTRAMUSCULAR; INTRAVENOUS at 11:54

## 2018-04-10 RX ADMIN — ONDANSETRON 4 MG: 2 INJECTION, SOLUTION INTRAMUSCULAR; INTRAVENOUS at 11:21

## 2018-04-10 RX ADMIN — HYDROMORPHONE HYDROCHLORIDE 0.5 MG: 1 INJECTION, SOLUTION INTRAMUSCULAR; INTRAVENOUS; SUBCUTANEOUS at 12:16

## 2018-04-10 RX ADMIN — FENTANYL CITRATE 50 MCG: 50 INJECTION, SOLUTION INTRAMUSCULAR; INTRAVENOUS at 11:33

## 2018-04-10 RX ADMIN — DEXAMETHASONE SODIUM PHOSPHATE 10 MG: 4 INJECTION, SOLUTION INTRA-ARTICULAR; INTRALESIONAL; INTRAMUSCULAR; INTRAVENOUS; SOFT TISSUE at 10:44

## 2018-04-10 ASSESSMENT — PULMONARY FUNCTION TESTS
PIF_VALUE: 0
PIF_VALUE: 24
PIF_VALUE: 15
PIF_VALUE: 1
PIF_VALUE: 2
PIF_VALUE: 3
PIF_VALUE: 15
PIF_VALUE: 16
PIF_VALUE: 36
PIF_VALUE: 1
PIF_VALUE: 15
PIF_VALUE: 2
PIF_VALUE: 1
PIF_VALUE: 2
PIF_VALUE: 3
PIF_VALUE: 30
PIF_VALUE: 24
PIF_VALUE: 15
PIF_VALUE: 28
PIF_VALUE: 16
PIF_VALUE: 34
PIF_VALUE: 3
PIF_VALUE: 1
PIF_VALUE: 1
PIF_VALUE: 3
PIF_VALUE: 24
PIF_VALUE: 2
PIF_VALUE: 3
PIF_VALUE: 29
PIF_VALUE: 35
PIF_VALUE: 39
PIF_VALUE: 24
PIF_VALUE: 24
PIF_VALUE: 13
PIF_VALUE: 4
PIF_VALUE: 15
PIF_VALUE: 26
PIF_VALUE: 3
PIF_VALUE: 12
PIF_VALUE: 24
PIF_VALUE: 4
PIF_VALUE: 28
PIF_VALUE: 29
PIF_VALUE: 2
PIF_VALUE: 31
PIF_VALUE: 13
PIF_VALUE: 2
PIF_VALUE: 13
PIF_VALUE: 4

## 2018-04-10 ASSESSMENT — PAIN SCALES - GENERAL
PAINLEVEL_OUTOF10: 8
PAINLEVEL_OUTOF10: 9
PAINLEVEL_OUTOF10: 10
PAINLEVEL_OUTOF10: 8
PAINLEVEL_OUTOF10: 10

## 2018-04-10 ASSESSMENT — PAIN SCALES - WONG BAKER: WONGBAKER_NUMERICALRESPONSE: 8

## 2018-04-10 ASSESSMENT — PAIN DESCRIPTION - ORIENTATION: ORIENTATION: LEFT

## 2018-04-10 ASSESSMENT — PAIN DESCRIPTION - LOCATION: LOCATION: HAND

## 2018-04-10 ASSESSMENT — LIFESTYLE VARIABLES: SMOKING_STATUS: 0

## 2018-04-10 ASSESSMENT — PAIN DESCRIPTION - PAIN TYPE: TYPE: SURGICAL PAIN

## 2018-04-12 DIAGNOSIS — S62.601G: Primary | ICD-10-CM

## 2018-04-12 DIAGNOSIS — S62.665B NONDISPLACED FRACTURE OF DISTAL PHALANX OF LEFT RING FINGER, INITIAL ENCOUNTER FOR OPEN FRACTURE: ICD-10-CM

## 2018-04-12 DIAGNOSIS — S62.645B NONDISPLACED FRACTURE OF PROXIMAL PHALANX OF LEFT RING FINGER, INITIAL ENCOUNTER FOR OPEN FRACTURE: ICD-10-CM

## 2018-04-12 DIAGNOSIS — S62.663A: ICD-10-CM

## 2018-04-12 RX ORDER — OXYCODONE HYDROCHLORIDE AND ACETAMINOPHEN 5; 325 MG/1; MG/1
1 TABLET ORAL EVERY 6 HOURS PRN
Qty: 28 TABLET | Refills: 0 | Status: SHIPPED | OUTPATIENT
Start: 2018-04-12 | End: 2018-04-16 | Stop reason: SDUPTHER

## 2018-04-13 ENCOUNTER — TELEPHONE (OUTPATIENT)
Dept: ORTHOPEDIC SURGERY | Age: 33
End: 2018-04-13

## 2018-04-13 DIAGNOSIS — S62.663A: ICD-10-CM

## 2018-04-13 DIAGNOSIS — S62.665B NONDISPLACED FRACTURE OF DISTAL PHALANX OF LEFT RING FINGER, INITIAL ENCOUNTER FOR OPEN FRACTURE: ICD-10-CM

## 2018-04-13 DIAGNOSIS — S62.601G: Primary | ICD-10-CM

## 2018-04-16 ENCOUNTER — OFFICE VISIT (OUTPATIENT)
Dept: ORTHOPEDIC SURGERY | Age: 33
End: 2018-04-16
Payer: MEDICAID

## 2018-04-16 VITALS
SYSTOLIC BLOOD PRESSURE: 130 MMHG | RESPIRATION RATE: 18 BRPM | HEIGHT: 66 IN | DIASTOLIC BLOOD PRESSURE: 80 MMHG | BODY MASS INDEX: 28.93 KG/M2 | HEART RATE: 88 BPM | TEMPERATURE: 98.2 F | WEIGHT: 180 LBS

## 2018-04-16 DIAGNOSIS — S62.645B NONDISPLACED FRACTURE OF PROXIMAL PHALANX OF LEFT RING FINGER, INITIAL ENCOUNTER FOR OPEN FRACTURE: Primary | ICD-10-CM

## 2018-04-16 PROCEDURE — 29125 APPL SHORT ARM SPLINT STATIC: CPT | Performed by: ORTHOPAEDIC SURGERY

## 2018-04-16 PROCEDURE — 99024 POSTOP FOLLOW-UP VISIT: CPT | Performed by: ORTHOPAEDIC SURGERY

## 2018-04-16 RX ORDER — OXYCODONE HYDROCHLORIDE AND ACETAMINOPHEN 5; 325 MG/1; MG/1
1 TABLET ORAL EVERY 6 HOURS PRN
Qty: 28 TABLET | Refills: 0 | Status: SHIPPED | OUTPATIENT
Start: 2018-04-16 | End: 2018-04-26 | Stop reason: SDUPTHER

## 2018-04-16 RX ORDER — METHYLPREDNISOLONE 4 MG/1
TABLET ORAL
Qty: 1 KIT | Refills: 0 | Status: SHIPPED | OUTPATIENT
Start: 2018-04-16 | End: 2018-04-22

## 2018-04-18 ENCOUNTER — HOSPITAL ENCOUNTER (OUTPATIENT)
Dept: OCCUPATIONAL THERAPY | Age: 33
Setting detail: THERAPIES SERIES
Discharge: HOME | End: 2018-04-18
Payer: MEDICAID

## 2018-04-18 PROCEDURE — 97110 THERAPEUTIC EXERCISES: CPT | Performed by: OCCUPATIONAL THERAPIST

## 2018-04-18 PROCEDURE — G8984 CARRY CURRENT STATUS: HCPCS | Performed by: OCCUPATIONAL THERAPIST

## 2018-04-18 PROCEDURE — G8985 CARRY GOAL STATUS: HCPCS | Performed by: OCCUPATIONAL THERAPIST

## 2018-04-18 PROCEDURE — 97140 MANUAL THERAPY 1/> REGIONS: CPT | Performed by: OCCUPATIONAL THERAPIST

## 2018-04-18 PROCEDURE — 97165 OT EVAL LOW COMPLEX 30 MIN: CPT | Performed by: OCCUPATIONAL THERAPIST

## 2018-04-20 ENCOUNTER — HOSPITAL ENCOUNTER (OUTPATIENT)
Dept: OCCUPATIONAL THERAPY | Age: 33
Setting detail: THERAPIES SERIES
Discharge: HOME | End: 2018-04-20
Payer: MEDICAID

## 2018-04-20 PROCEDURE — L3906 WHO W/O JOINTS CF: HCPCS | Performed by: OCCUPATIONAL THERAPIST

## 2018-04-20 PROCEDURE — 97760 ORTHOTIC MGMT&TRAING 1ST ENC: CPT | Performed by: OCCUPATIONAL THERAPIST

## 2018-04-20 PROCEDURE — 97140 MANUAL THERAPY 1/> REGIONS: CPT | Performed by: OCCUPATIONAL THERAPIST

## 2018-04-22 ENCOUNTER — APPOINTMENT (OUTPATIENT)
Dept: GENERAL RADIOLOGY | Age: 33
End: 2018-04-22
Payer: MEDICAID

## 2018-04-22 ENCOUNTER — HOSPITAL ENCOUNTER (EMERGENCY)
Age: 33
Discharge: HOME | End: 2018-04-22
Payer: MEDICAID

## 2018-04-22 VITALS
WEIGHT: 180 LBS | DIASTOLIC BLOOD PRESSURE: 68 MMHG | BODY MASS INDEX: 28.93 KG/M2 | SYSTOLIC BLOOD PRESSURE: 157 MMHG | RESPIRATION RATE: 20 BRPM | TEMPERATURE: 97 F | HEIGHT: 66 IN | HEART RATE: 98 BPM | OXYGEN SATURATION: 96 %

## 2018-04-22 DIAGNOSIS — G89.18 POST-OPERATIVE PAIN: Primary | ICD-10-CM

## 2018-04-22 DIAGNOSIS — L03.119 CELLULITIS OF HAND: ICD-10-CM

## 2018-04-22 LAB
ANION GAP SERPL CALCULATED.3IONS-SCNC: 13 MMOL/L (ref 7–16)
BASOPHILS ABSOLUTE: 0.04 E9/L (ref 0–0.2)
BASOPHILS RELATIVE PERCENT: 0.6 % (ref 0–2)
BUN BLDV-MCNC: 5 MG/DL (ref 6–20)
C-REACTIVE PROTEIN: 1.1 MG/DL (ref 0–0.4)
CALCIUM SERPL-MCNC: 9.1 MG/DL (ref 8.6–10.2)
CHLORIDE BLD-SCNC: 104 MMOL/L (ref 98–107)
CO2: 24 MMOL/L (ref 22–29)
CREAT SERPL-MCNC: 1 MG/DL (ref 0.5–1)
EOSINOPHILS ABSOLUTE: 0.24 E9/L (ref 0.05–0.5)
EOSINOPHILS RELATIVE PERCENT: 3.9 % (ref 0–6)
GFR AFRICAN AMERICAN: >60
GFR NON-AFRICAN AMERICAN: >60 ML/MIN/1.73
GLUCOSE BLD-MCNC: 105 MG/DL (ref 74–109)
HCT VFR BLD CALC: 34.2 % (ref 34–48)
HEMOGLOBIN: 11.3 G/DL (ref 11.5–15.5)
IMMATURE GRANULOCYTES #: 0.02 E9/L
IMMATURE GRANULOCYTES %: 0.3 % (ref 0–5)
LYMPHOCYTES ABSOLUTE: 2.55 E9/L (ref 1.5–4)
LYMPHOCYTES RELATIVE PERCENT: 41.2 % (ref 20–42)
MCH RBC QN AUTO: 27.3 PG (ref 26–35)
MCHC RBC AUTO-ENTMCNC: 33 % (ref 32–34.5)
MCV RBC AUTO: 82.6 FL (ref 80–99.9)
MONOCYTES ABSOLUTE: 0.51 E9/L (ref 0.1–0.95)
MONOCYTES RELATIVE PERCENT: 8.2 % (ref 2–12)
NEUTROPHILS ABSOLUTE: 2.83 E9/L (ref 1.8–7.3)
NEUTROPHILS RELATIVE PERCENT: 45.8 % (ref 43–80)
PDW BLD-RTO: 16.5 FL (ref 11.5–15)
PLATELET # BLD: 402 E9/L (ref 130–450)
PMV BLD AUTO: 9.3 FL (ref 7–12)
POTASSIUM SERPL-SCNC: 3.6 MMOL/L (ref 3.5–5)
RBC # BLD: 4.14 E12/L (ref 3.5–5.5)
SODIUM BLD-SCNC: 141 MMOL/L (ref 132–146)
WBC # BLD: 6.2 E9/L (ref 4.5–11.5)

## 2018-04-22 PROCEDURE — 6370000000 HC RX 637 (ALT 250 FOR IP): Performed by: PHYSICIAN ASSISTANT

## 2018-04-22 PROCEDURE — 80048 BASIC METABOLIC PNL TOTAL CA: CPT

## 2018-04-22 PROCEDURE — 85651 RBC SED RATE NONAUTOMATED: CPT

## 2018-04-22 PROCEDURE — 99283 EMERGENCY DEPT VISIT LOW MDM: CPT

## 2018-04-22 PROCEDURE — 86140 C-REACTIVE PROTEIN: CPT

## 2018-04-22 PROCEDURE — 73130 X-RAY EXAM OF HAND: CPT

## 2018-04-22 PROCEDURE — 87040 BLOOD CULTURE FOR BACTERIA: CPT

## 2018-04-22 PROCEDURE — 36415 COLL VENOUS BLD VENIPUNCTURE: CPT

## 2018-04-22 PROCEDURE — 85025 COMPLETE CBC W/AUTO DIFF WBC: CPT

## 2018-04-22 RX ORDER — HYDROCODONE BITARTRATE AND ACETAMINOPHEN 5; 325 MG/1; MG/1
1 TABLET ORAL ONCE
Status: COMPLETED | OUTPATIENT
Start: 2018-04-22 | End: 2018-04-22

## 2018-04-22 RX ORDER — CEPHALEXIN 500 MG/1
500 CAPSULE ORAL 4 TIMES DAILY
Qty: 40 CAPSULE | Refills: 0 | Status: SHIPPED | OUTPATIENT
Start: 2018-04-22 | End: 2018-05-02

## 2018-04-22 RX ORDER — CEPHALEXIN 500 MG/1
500 CAPSULE ORAL ONCE
Status: COMPLETED | OUTPATIENT
Start: 2018-04-22 | End: 2018-04-22

## 2018-04-22 RX ADMIN — CEPHALEXIN 500 MG: 500 CAPSULE ORAL at 23:32

## 2018-04-22 RX ADMIN — HYDROCODONE BITARTRATE AND ACETAMINOPHEN 1 TABLET: 5; 325 TABLET ORAL at 23:23

## 2018-04-22 ASSESSMENT — PAIN DESCRIPTION - DESCRIPTORS: DESCRIPTORS: THROBBING

## 2018-04-22 ASSESSMENT — PAIN SCALES - GENERAL
PAINLEVEL_OUTOF10: 8
PAINLEVEL_OUTOF10: 8

## 2018-04-22 ASSESSMENT — PAIN DESCRIPTION - FREQUENCY: FREQUENCY: CONTINUOUS

## 2018-04-22 ASSESSMENT — PAIN DESCRIPTION - ORIENTATION: ORIENTATION: LEFT

## 2018-04-22 ASSESSMENT — PAIN DESCRIPTION - LOCATION: LOCATION: HAND

## 2018-04-23 ENCOUNTER — HOSPITAL ENCOUNTER (OUTPATIENT)
Dept: OCCUPATIONAL THERAPY | Age: 33
Setting detail: THERAPIES SERIES
Discharge: HOME | End: 2018-04-23
Payer: MEDICAID

## 2018-04-23 LAB — SEDIMENTATION RATE, ERYTHROCYTE: 20 MM/HR (ref 0–20)

## 2018-04-23 PROCEDURE — 97140 MANUAL THERAPY 1/> REGIONS: CPT | Performed by: OCCUPATIONAL THERAPIST

## 2018-04-23 PROCEDURE — 97530 THERAPEUTIC ACTIVITIES: CPT | Performed by: OCCUPATIONAL THERAPIST

## 2018-04-26 ENCOUNTER — OFFICE VISIT (OUTPATIENT)
Dept: ORTHOPEDIC SURGERY | Age: 33
End: 2018-04-26

## 2018-04-26 VITALS
TEMPERATURE: 98.7 F | HEART RATE: 73 BPM | SYSTOLIC BLOOD PRESSURE: 116 MMHG | DIASTOLIC BLOOD PRESSURE: 72 MMHG | RESPIRATION RATE: 18 BRPM

## 2018-04-26 DIAGNOSIS — S62.645B NONDISPLACED FRACTURE OF PROXIMAL PHALANX OF LEFT RING FINGER, INITIAL ENCOUNTER FOR OPEN FRACTURE: ICD-10-CM

## 2018-04-26 DIAGNOSIS — S62.663A: ICD-10-CM

## 2018-04-26 DIAGNOSIS — S62.665B NONDISPLACED FRACTURE OF DISTAL PHALANX OF LEFT RING FINGER, INITIAL ENCOUNTER FOR OPEN FRACTURE: ICD-10-CM

## 2018-04-26 DIAGNOSIS — S62.601G: Primary | ICD-10-CM

## 2018-04-26 PROCEDURE — 99024 POSTOP FOLLOW-UP VISIT: CPT | Performed by: ORTHOPAEDIC SURGERY

## 2018-04-26 RX ORDER — OXYCODONE HYDROCHLORIDE AND ACETAMINOPHEN 5; 325 MG/1; MG/1
1 TABLET ORAL EVERY 6 HOURS PRN
Qty: 28 TABLET | Refills: 0 | Status: SHIPPED | OUTPATIENT
Start: 2018-04-26 | End: 2018-05-03

## 2018-04-27 ENCOUNTER — HOSPITAL ENCOUNTER (OUTPATIENT)
Dept: OCCUPATIONAL THERAPY | Age: 33
Setting detail: THERAPIES SERIES
Discharge: HOME | End: 2018-04-27
Payer: MEDICAID

## 2018-04-27 PROCEDURE — 97530 THERAPEUTIC ACTIVITIES: CPT | Performed by: OCCUPATIONAL THERAPIST

## 2018-04-27 PROCEDURE — 97110 THERAPEUTIC EXERCISES: CPT | Performed by: OCCUPATIONAL THERAPIST

## 2018-04-28 LAB
BLOOD CULTURE, ROUTINE: NORMAL
CULTURE, BLOOD 2: NORMAL

## 2018-04-30 ENCOUNTER — HOSPITAL ENCOUNTER (OUTPATIENT)
Dept: OCCUPATIONAL THERAPY | Age: 33
Setting detail: THERAPIES SERIES
Discharge: HOME | End: 2018-04-30
Payer: MEDICAID

## 2018-04-30 PROCEDURE — 97140 MANUAL THERAPY 1/> REGIONS: CPT | Performed by: OCCUPATIONAL THERAPIST

## 2018-04-30 PROCEDURE — 97530 THERAPEUTIC ACTIVITIES: CPT | Performed by: OCCUPATIONAL THERAPIST

## 2018-05-04 ENCOUNTER — HOSPITAL ENCOUNTER (OUTPATIENT)
Dept: OCCUPATIONAL THERAPY | Age: 33
Setting detail: THERAPIES SERIES
Discharge: HOME | End: 2018-05-04
Payer: COMMERCIAL

## 2018-05-04 PROCEDURE — 97530 THERAPEUTIC ACTIVITIES: CPT

## 2018-05-04 PROCEDURE — 97140 MANUAL THERAPY 1/> REGIONS: CPT

## 2018-05-10 ENCOUNTER — OFFICE VISIT (OUTPATIENT)
Dept: ORTHOPEDIC SURGERY | Age: 33
End: 2018-05-10
Payer: MEDICAID

## 2018-05-10 VITALS
HEIGHT: 67 IN | RESPIRATION RATE: 18 BRPM | DIASTOLIC BLOOD PRESSURE: 78 MMHG | SYSTOLIC BLOOD PRESSURE: 120 MMHG | HEART RATE: 74 BPM | BODY MASS INDEX: 26.68 KG/M2 | TEMPERATURE: 98.5 F | WEIGHT: 170 LBS

## 2018-05-10 DIAGNOSIS — S62.665B NONDISPLACED FRACTURE OF DISTAL PHALANX OF LEFT RING FINGER, INITIAL ENCOUNTER FOR OPEN FRACTURE: Primary | ICD-10-CM

## 2018-05-10 DIAGNOSIS — S62.665B NONDISPLACED FRACTURE OF DISTAL PHALANX OF LEFT RING FINGER, INITIAL ENCOUNTER FOR OPEN FRACTURE: ICD-10-CM

## 2018-05-10 PROCEDURE — 99024 POSTOP FOLLOW-UP VISIT: CPT | Performed by: ORTHOPAEDIC SURGERY

## 2018-05-10 PROCEDURE — 29125 APPL SHORT ARM SPLINT STATIC: CPT | Performed by: ORTHOPAEDIC SURGERY

## 2018-05-10 RX ORDER — OXYCODONE HYDROCHLORIDE AND ACETAMINOPHEN 5; 325 MG/1; MG/1
1 TABLET ORAL EVERY 4 HOURS PRN
COMMUNITY
End: 2018-05-11 | Stop reason: SDUPTHER

## 2018-05-10 RX ORDER — OXYCODONE HYDROCHLORIDE AND ACETAMINOPHEN 5; 325 MG/1; MG/1
1 TABLET ORAL EVERY 6 HOURS PRN
Qty: 28 TABLET | Refills: 0 | Status: CANCELLED | OUTPATIENT
Start: 2018-05-10

## 2018-05-11 DIAGNOSIS — S62.665B NONDISPLACED FRACTURE OF DISTAL PHALANX OF LEFT RING FINGER, INITIAL ENCOUNTER FOR OPEN FRACTURE: ICD-10-CM

## 2018-05-11 DIAGNOSIS — S62.665B NONDISPLACED FRACTURE OF DISTAL PHALANX OF LEFT RING FINGER, INITIAL ENCOUNTER FOR OPEN FRACTURE: Primary | ICD-10-CM

## 2018-05-11 DIAGNOSIS — S62.663A: ICD-10-CM

## 2018-05-11 DIAGNOSIS — S62.645B NONDISPLACED FRACTURE OF PROXIMAL PHALANX OF LEFT RING FINGER, INITIAL ENCOUNTER FOR OPEN FRACTURE: ICD-10-CM

## 2018-05-11 RX ORDER — DOXYCYCLINE HYCLATE 100 MG/1
100 CAPSULE ORAL 2 TIMES DAILY
Qty: 20 CAPSULE | Refills: 0 | Status: SHIPPED | OUTPATIENT
Start: 2018-05-11 | End: 2018-05-21

## 2018-05-11 RX ORDER — OXYCODONE HYDROCHLORIDE AND ACETAMINOPHEN 5; 325 MG/1; MG/1
1 TABLET ORAL EVERY 6 HOURS PRN
Qty: 28 TABLET | Refills: 0 | Status: SHIPPED | OUTPATIENT
Start: 2018-05-11 | End: 2018-05-22 | Stop reason: SDUPTHER

## 2018-05-11 RX ORDER — SULFAMETHOXAZOLE AND TRIMETHOPRIM 800; 160 MG/1; MG/1
1 TABLET ORAL 2 TIMES DAILY
Qty: 20 TABLET | Refills: 0 | Status: SHIPPED | OUTPATIENT
Start: 2018-05-11 | End: 2018-05-11 | Stop reason: CLARIF

## 2018-05-15 ENCOUNTER — HOSPITAL ENCOUNTER (OUTPATIENT)
Dept: OCCUPATIONAL THERAPY | Age: 33
Setting detail: THERAPIES SERIES
Discharge: HOME | End: 2018-05-15
Payer: COMMERCIAL

## 2018-05-15 PROCEDURE — 97110 THERAPEUTIC EXERCISES: CPT

## 2018-05-15 PROCEDURE — 97022 WHIRLPOOL THERAPY: CPT

## 2018-05-15 PROCEDURE — 97140 MANUAL THERAPY 1/> REGIONS: CPT

## 2018-05-15 PROCEDURE — 97530 THERAPEUTIC ACTIVITIES: CPT

## 2018-05-17 ENCOUNTER — HOSPITAL ENCOUNTER (OUTPATIENT)
Dept: OCCUPATIONAL THERAPY | Age: 33
Setting detail: THERAPIES SERIES
Discharge: HOME | End: 2018-05-17
Payer: COMMERCIAL

## 2018-05-17 PROCEDURE — 97022 WHIRLPOOL THERAPY: CPT

## 2018-05-17 PROCEDURE — 97530 THERAPEUTIC ACTIVITIES: CPT

## 2018-05-17 PROCEDURE — 97110 THERAPEUTIC EXERCISES: CPT

## 2018-05-17 PROCEDURE — 97140 MANUAL THERAPY 1/> REGIONS: CPT

## 2018-05-22 ENCOUNTER — HOSPITAL ENCOUNTER (OUTPATIENT)
Dept: OCCUPATIONAL THERAPY | Age: 33
Setting detail: THERAPIES SERIES
Discharge: HOME | End: 2018-05-22
Payer: COMMERCIAL

## 2018-05-22 DIAGNOSIS — S62.665B NONDISPLACED FRACTURE OF DISTAL PHALANX OF LEFT RING FINGER, INITIAL ENCOUNTER FOR OPEN FRACTURE: ICD-10-CM

## 2018-05-22 PROCEDURE — 97140 MANUAL THERAPY 1/> REGIONS: CPT

## 2018-05-22 PROCEDURE — 97530 THERAPEUTIC ACTIVITIES: CPT

## 2018-05-22 PROCEDURE — 97022 WHIRLPOOL THERAPY: CPT

## 2018-05-22 PROCEDURE — 97110 THERAPEUTIC EXERCISES: CPT

## 2018-05-22 RX ORDER — OXYCODONE HYDROCHLORIDE AND ACETAMINOPHEN 5; 325 MG/1; MG/1
1 TABLET ORAL EVERY 6 HOURS PRN
Qty: 28 TABLET | Refills: 0 | Status: SHIPPED | OUTPATIENT
Start: 2018-05-22 | End: 2018-06-01 | Stop reason: SDUPTHER

## 2018-05-29 ENCOUNTER — HOSPITAL ENCOUNTER (OUTPATIENT)
Dept: OCCUPATIONAL THERAPY | Age: 33
Setting detail: THERAPIES SERIES
Discharge: HOME | End: 2018-05-29
Payer: COMMERCIAL

## 2018-05-29 PROCEDURE — 97140 MANUAL THERAPY 1/> REGIONS: CPT

## 2018-05-29 PROCEDURE — 97110 THERAPEUTIC EXERCISES: CPT

## 2018-05-29 PROCEDURE — 97530 THERAPEUTIC ACTIVITIES: CPT

## 2018-05-29 PROCEDURE — 97022 WHIRLPOOL THERAPY: CPT

## 2018-05-31 ENCOUNTER — HOSPITAL ENCOUNTER (OUTPATIENT)
Dept: OCCUPATIONAL THERAPY | Age: 33
Setting detail: THERAPIES SERIES
Discharge: HOME | End: 2018-05-31
Payer: COMMERCIAL

## 2018-05-31 PROCEDURE — 97530 THERAPEUTIC ACTIVITIES: CPT

## 2018-05-31 PROCEDURE — 97022 WHIRLPOOL THERAPY: CPT

## 2018-05-31 PROCEDURE — 97110 THERAPEUTIC EXERCISES: CPT

## 2018-05-31 PROCEDURE — 97140 MANUAL THERAPY 1/> REGIONS: CPT

## 2018-06-01 DIAGNOSIS — S62.665B NONDISPLACED FRACTURE OF DISTAL PHALANX OF LEFT RING FINGER, INITIAL ENCOUNTER FOR OPEN FRACTURE: ICD-10-CM

## 2018-06-01 RX ORDER — OXYCODONE HYDROCHLORIDE AND ACETAMINOPHEN 5; 325 MG/1; MG/1
1 TABLET ORAL EVERY 6 HOURS PRN
Qty: 28 TABLET | Refills: 0 | Status: SHIPPED | OUTPATIENT
Start: 2018-06-01 | End: 2018-06-08

## 2018-06-05 ENCOUNTER — HOSPITAL ENCOUNTER (OUTPATIENT)
Dept: OCCUPATIONAL THERAPY | Age: 33
Setting detail: THERAPIES SERIES
Discharge: HOME | End: 2018-06-05
Payer: COMMERCIAL

## 2018-06-05 PROCEDURE — 97530 THERAPEUTIC ACTIVITIES: CPT

## 2018-06-05 PROCEDURE — 97110 THERAPEUTIC EXERCISES: CPT

## 2018-06-05 PROCEDURE — 97140 MANUAL THERAPY 1/> REGIONS: CPT

## 2018-06-05 PROCEDURE — 97022 WHIRLPOOL THERAPY: CPT

## 2018-06-07 ENCOUNTER — HOSPITAL ENCOUNTER (OUTPATIENT)
Dept: OCCUPATIONAL THERAPY | Age: 33
Setting detail: THERAPIES SERIES
Discharge: HOME | End: 2018-06-07
Payer: COMMERCIAL

## 2018-06-07 PROCEDURE — 97022 WHIRLPOOL THERAPY: CPT

## 2018-06-07 PROCEDURE — 97110 THERAPEUTIC EXERCISES: CPT

## 2018-06-07 PROCEDURE — 97140 MANUAL THERAPY 1/> REGIONS: CPT

## 2018-06-07 PROCEDURE — 97530 THERAPEUTIC ACTIVITIES: CPT

## 2018-06-08 ENCOUNTER — TELEPHONE (OUTPATIENT)
Dept: ORTHOPEDIC SURGERY | Age: 33
End: 2018-06-08

## 2018-06-08 DIAGNOSIS — S62.665B NONDISPLACED FRACTURE OF DISTAL PHALANX OF LEFT RING FINGER, INITIAL ENCOUNTER FOR OPEN FRACTURE: Primary | ICD-10-CM

## 2018-06-11 ENCOUNTER — OFFICE VISIT (OUTPATIENT)
Dept: ORTHOPEDIC SURGERY | Age: 33
End: 2018-06-11

## 2018-06-11 VITALS
SYSTOLIC BLOOD PRESSURE: 129 MMHG | HEART RATE: 97 BPM | DIASTOLIC BLOOD PRESSURE: 88 MMHG | RESPIRATION RATE: 16 BRPM | TEMPERATURE: 98.2 F

## 2018-06-11 DIAGNOSIS — S62.645B NONDISPLACED FRACTURE OF PROXIMAL PHALANX OF LEFT RING FINGER, INITIAL ENCOUNTER FOR OPEN FRACTURE: ICD-10-CM

## 2018-06-11 DIAGNOSIS — S62.663A: ICD-10-CM

## 2018-06-11 DIAGNOSIS — S62.665B NONDISPLACED FRACTURE OF DISTAL PHALANX OF LEFT RING FINGER, INITIAL ENCOUNTER FOR OPEN FRACTURE: Primary | ICD-10-CM

## 2018-06-11 PROCEDURE — 99024 POSTOP FOLLOW-UP VISIT: CPT | Performed by: ORTHOPAEDIC SURGERY

## 2018-06-11 RX ORDER — HYDROCODONE BITARTRATE AND ACETAMINOPHEN 5; 325 MG/1; MG/1
1 TABLET ORAL EVERY 6 HOURS PRN
Qty: 28 TABLET | Refills: 0 | Status: SHIPPED | OUTPATIENT
Start: 2018-06-11 | End: 2018-06-18

## 2018-06-12 ENCOUNTER — HOSPITAL ENCOUNTER (OUTPATIENT)
Dept: OCCUPATIONAL THERAPY | Age: 33
Setting detail: THERAPIES SERIES
Discharge: HOME | End: 2018-06-12
Payer: COMMERCIAL

## 2018-06-12 PROCEDURE — 97760 ORTHOTIC MGMT&TRAING 1ST ENC: CPT

## 2018-06-12 PROCEDURE — 97140 MANUAL THERAPY 1/> REGIONS: CPT

## 2018-06-12 PROCEDURE — 97022 WHIRLPOOL THERAPY: CPT

## 2018-06-12 PROCEDURE — 97110 THERAPEUTIC EXERCISES: CPT

## 2018-06-14 ENCOUNTER — HOSPITAL ENCOUNTER (OUTPATIENT)
Dept: OCCUPATIONAL THERAPY | Age: 33
Setting detail: THERAPIES SERIES
Discharge: HOME | End: 2018-06-14
Payer: COMMERCIAL

## 2018-06-14 PROCEDURE — 97530 THERAPEUTIC ACTIVITIES: CPT

## 2018-06-14 PROCEDURE — 97022 WHIRLPOOL THERAPY: CPT

## 2018-06-14 PROCEDURE — 97110 THERAPEUTIC EXERCISES: CPT

## 2018-06-14 PROCEDURE — 97140 MANUAL THERAPY 1/> REGIONS: CPT

## 2018-06-18 DIAGNOSIS — S62.665B NONDISPLACED FRACTURE OF DISTAL PHALANX OF LEFT RING FINGER, INITIAL ENCOUNTER FOR OPEN FRACTURE: ICD-10-CM

## 2018-06-18 DIAGNOSIS — S62.663A: ICD-10-CM

## 2018-06-18 DIAGNOSIS — S62.645B NONDISPLACED FRACTURE OF PROXIMAL PHALANX OF LEFT RING FINGER, INITIAL ENCOUNTER FOR OPEN FRACTURE: ICD-10-CM

## 2018-06-19 ENCOUNTER — HOSPITAL ENCOUNTER (OUTPATIENT)
Dept: OCCUPATIONAL THERAPY | Age: 33
Setting detail: THERAPIES SERIES
Discharge: HOME | End: 2018-06-19
Payer: COMMERCIAL

## 2018-06-19 PROCEDURE — 97530 THERAPEUTIC ACTIVITIES: CPT

## 2018-06-19 PROCEDURE — 97110 THERAPEUTIC EXERCISES: CPT

## 2018-06-19 PROCEDURE — 97022 WHIRLPOOL THERAPY: CPT

## 2018-06-19 PROCEDURE — 97140 MANUAL THERAPY 1/> REGIONS: CPT

## 2018-06-19 RX ORDER — HYDROCODONE BITARTRATE AND ACETAMINOPHEN 5; 325 MG/1; MG/1
1 TABLET ORAL EVERY 6 HOURS PRN
Qty: 28 TABLET | Refills: 0 | Status: CANCELLED | OUTPATIENT
Start: 2018-06-19 | End: 2018-06-26

## 2018-06-19 RX ORDER — ACETAMINOPHEN AND CODEINE PHOSPHATE 300; 30 MG/1; MG/1
1 TABLET ORAL EVERY 6 HOURS PRN
Qty: 28 TABLET | Refills: 0 | Status: SHIPPED | OUTPATIENT
Start: 2018-06-19 | End: 2018-06-26

## 2018-06-26 DIAGNOSIS — S62.601G: ICD-10-CM

## 2018-06-26 DIAGNOSIS — S62.665B NONDISPLACED FRACTURE OF DISTAL PHALANX OF LEFT RING FINGER, INITIAL ENCOUNTER FOR OPEN FRACTURE: Primary | ICD-10-CM

## 2018-06-26 DIAGNOSIS — S62.645B NONDISPLACED FRACTURE OF PROXIMAL PHALANX OF LEFT RING FINGER, INITIAL ENCOUNTER FOR OPEN FRACTURE: ICD-10-CM

## 2018-06-26 DIAGNOSIS — S62.663A: ICD-10-CM

## 2018-06-26 RX ORDER — TRAMADOL HYDROCHLORIDE 50 MG/1
50 TABLET ORAL EVERY 6 HOURS PRN
Qty: 28 TABLET | Refills: 0 | Status: SHIPPED | OUTPATIENT
Start: 2018-06-26 | End: 2018-07-12 | Stop reason: SDUPTHER

## 2018-06-28 ENCOUNTER — HOSPITAL ENCOUNTER (OUTPATIENT)
Dept: OCCUPATIONAL THERAPY | Age: 33
Setting detail: THERAPIES SERIES
Discharge: HOME | End: 2018-06-28
Payer: COMMERCIAL

## 2018-06-28 PROCEDURE — 97110 THERAPEUTIC EXERCISES: CPT

## 2018-06-28 PROCEDURE — 97530 THERAPEUTIC ACTIVITIES: CPT

## 2018-06-28 PROCEDURE — 97022 WHIRLPOOL THERAPY: CPT

## 2018-06-28 PROCEDURE — 97140 MANUAL THERAPY 1/> REGIONS: CPT

## 2018-07-03 ENCOUNTER — HOSPITAL ENCOUNTER (OUTPATIENT)
Dept: OCCUPATIONAL THERAPY | Age: 33
Setting detail: THERAPIES SERIES
Discharge: HOME | End: 2018-07-03
Payer: COMMERCIAL

## 2018-07-03 PROCEDURE — 97110 THERAPEUTIC EXERCISES: CPT

## 2018-07-03 PROCEDURE — 97530 THERAPEUTIC ACTIVITIES: CPT

## 2018-07-03 PROCEDURE — 97022 WHIRLPOOL THERAPY: CPT

## 2018-07-03 NOTE — PROGRESS NOTES
OCCUPATIONAL THERAPY PROGRESS NOTE       SCOTT Lucas0 Shilpa Khan   Phone: [phone]   Fax: [phone]     Date:  7/3/2018   Initial Evaluation Date: 17     Patient Name:  Herrera Arellano                      :  1985     Restrictions/Precautions:  Per protocol, low fall risk  Diagnosis:  orif index prox phal. , middle, ring pin fixation of prox phalanx. L hand                                                  Insurance/Certification information:  Pending Medicaid  Referring Physician:  Dr. Matt Lopes  Date of Surgery/Injury: 4/10/18  Plan of care signed (Y/N):  no  Visit# / total visits:  additional sessions  Completed: -  Pain Level: moderate, aching    Subjective:  I feel like my whole hand is swollen. The PIP is sore since we worked it last visit. Objective:   INTERVENTION: COMPLETED REPS/TIME: SPECIFICS/COMMENTS:   Modality:      Fluidotherapy x 10 min    Paraffin      MHP      Estim      Manual Therapy:      Scar Massage      Soft Tissue:   x 10 min    Therapeutic Ex/Activities: Towel task x 12 reps    Gripper x 25# 100 reps     putty x 20 min Push, pull, twist, press with prolong stretches   UBE x 7 min    ROM x  CRC, joint mob, prolong holds                                                               Other:                    Current Assessment: Pt is making Fair progress toward stated plan of care. Mod swelling cont's. Some improved motion of uninvolved joints is noted.     LUE AROM/PROM: L Wrist ROM:   2018    Wrist Flexion 0-80:                              45  60   Wrist Extension 0-70:                         30  72     L Hand ROM  Index  MCP 0-90:                                -10/10  0/38(0/75)  Index PIP 0-100:                                 -35/55  -43/80(-32/82)  Index DIP 0-70:                                   0/10  -10/55(0/60)    Long  MCP 0-90:                                 -10/10  0/43(0/60)   Long PIP 0-100: -25/65  0/105  Long DIP 0-70:                                    0/10  0/70    Ring  MCP 0-90:                                 -10/10  0/45   Ring PIP 0-100:                                  -20/50  wnl  Ring DIP 0-70:                                    0/10  wnl    Little  MCP 0-90:                                 0/15  0-65  Little PIP 0-100:                                  0/55  wnl  Little DIP 0-70:                                    0/20   wnl    -Rehab Potential: Good  -Requires OT Follow Up: Yes  -Time In: 1030 -Time Out: 1115 Timed Treatment Minutes: 45 Minutes    Goals: 6 weeks or 18 visits   +  Additional 12 visits if needed  1) Patient will demonstrate good understanding of home program(exercises/activities/diagnosis/prognosis/goals) with good accuracy. Progressing   2) Patient will demonstrate increased active/passive range of motion of their left hand to [facility] for ADL/IADL completion. Progressing:  Index PIP, Ring MP lacking at this point    3) Patient will demonstrate increased /pinch strength of at least 10 / 5 pinch pounds of their L hand. (when protocol permitting)   When permitted  4) Patient to report decreased pain in their affected L hand from 2-8/10 to 0-2/10 or less with resistive functional use. Progressing  5) Increase in fine motor function as evidenced by decreased time to complete 9-hole peg test and/or MRMT test by at least 10 seconds. Progressing good  6) Patient to report 100% compliance with their splint wear, care, and precautions if needed/will fabricate as needed. Progressing. Currently using LMB size L for day use and gutter PIP splint for HS on her index finger of SONJA. 7)  Patient will be knowledgeable of edema control techniques as evident with decreases from mod to none. Progressing, kinesio taping use in place with patient education  8) Patient will demonstrate a non-tender/non-adherent scar.     Progressing kinesio taping and scar pad use in place with patient education  9) Patient will report ADL functions same as prior to diagnosis of fx of P2-P4 L. Slow Progress due to index finger PIP pain and lack of movement.      OT G-codes:  Carrying, Handling, Moving objects   (Current status): CK   (Discharge Goal): CI    Plan:   [x]  Continues Plan of care: Treatment delivered based on POC and graduated to patient's progress. Patient education continues at each visit to obtain maximum benefit from skilled OT intervention. [x]  Alter Plan of care: Continue skilled intervention at 1-2x a week for 6 weeks. 12 visits.    []  Discharge:      HANH Harris/CHUYITA [id number]

## 2018-07-10 ENCOUNTER — HOSPITAL ENCOUNTER (OUTPATIENT)
Dept: OCCUPATIONAL THERAPY | Age: 33
Setting detail: THERAPIES SERIES
Discharge: HOME | End: 2018-07-10
Payer: COMMERCIAL

## 2018-07-10 PROCEDURE — 97022 WHIRLPOOL THERAPY: CPT

## 2018-07-10 PROCEDURE — 97530 THERAPEUTIC ACTIVITIES: CPT

## 2018-07-10 PROCEDURE — 97110 THERAPEUTIC EXERCISES: CPT

## 2018-07-10 PROCEDURE — 97140 MANUAL THERAPY 1/> REGIONS: CPT

## 2018-07-10 NOTE — PROGRESS NOTES
ROM:   6/19/2018  7/10/2018    Wrist Flexion 0-80:                              45  60   Wrist Extension 0-70:                         30  72     L Hand ROM  Index  MCP 0-90:                                -10/10  0/38(0/75)  Index PIP 0-100:                                 -35/55  -43/80(-32/82)  -42/84(-28/85)  Index DIP 0-70:                                   0/10  -10/55(0/60)    Long  MCP 0-90:                                 -10/10  0/43(0/60)   Long PIP 0-100:                                  -25/65  0/105  Long DIP 0-70:                                    0/10  0/70    Ring  MCP 0-90:                                 -10/10  0/45   0/50(0/65)   After session  0/60(0/70)  Ring PIP 0-100:                                  -20/50  wnl  Ring DIP 0-70:                                    0/10  wnl    Little  MCP 0-90:                                 0/15  0-65  Little PIP 0-100:                                  0/55  wnl  Little DIP 0-70:                                    0/20   wnl    -Rehab Potential: Good  -Requires OT Follow Up: Yes  -Time In: 1000 -Time Out: 1115 Timed Treatment Minutes: 75 Minutes    Goals: 6 weeks or 18 visits   +  Additional 12 visits if needed  1) Patient will demonstrate good understanding of home program(exercises/activities/diagnosis/prognosis/goals) with good accuracy. Progressing   2) Patient will demonstrate increased active/passive range of motion of their left hand to [facility] for ADL/IADL completion. Progressing:  Index PIP, Ring MP lacking at this point    3) Patient will demonstrate increased /pinch strength of at least 10 / 5 pinch pounds of their L hand. (when protocol permitting)   Slow Progress:  /PINCH:  LUE  20#/10# RUE  85#/25#  4) Patient to report decreased pain in their affected L hand from 2-8/10 to 0-2/10 or less with resistive functional use.     Progressing  5) Increase in fine motor function as evidenced by decreased time to complete 9-hole peg test

## 2018-07-11 ENCOUNTER — TELEPHONE (OUTPATIENT)
Dept: ORTHOPEDIC SURGERY | Age: 33
End: 2018-07-11

## 2018-07-11 DIAGNOSIS — S62.665B NONDISPLACED FRACTURE OF DISTAL PHALANX OF LEFT RING FINGER, INITIAL ENCOUNTER FOR OPEN FRACTURE: Primary | ICD-10-CM

## 2018-07-12 ENCOUNTER — HOSPITAL ENCOUNTER (OUTPATIENT)
Dept: OCCUPATIONAL THERAPY | Age: 33
Setting detail: THERAPIES SERIES
Discharge: HOME | End: 2018-07-12
Payer: COMMERCIAL

## 2018-07-12 ENCOUNTER — OFFICE VISIT (OUTPATIENT)
Dept: ORTHOPEDIC SURGERY | Age: 33
End: 2018-07-12
Payer: COMMERCIAL

## 2018-07-12 VITALS — TEMPERATURE: 98.2 F | SYSTOLIC BLOOD PRESSURE: 120 MMHG | RESPIRATION RATE: 16 BRPM | DIASTOLIC BLOOD PRESSURE: 88 MMHG

## 2018-07-12 DIAGNOSIS — S62.665B NONDISPLACED FRACTURE OF DISTAL PHALANX OF LEFT RING FINGER, INITIAL ENCOUNTER FOR OPEN FRACTURE: Primary | ICD-10-CM

## 2018-07-12 DIAGNOSIS — S62.601G: ICD-10-CM

## 2018-07-12 DIAGNOSIS — S62.663A: ICD-10-CM

## 2018-07-12 DIAGNOSIS — S62.645B NONDISPLACED FRACTURE OF PROXIMAL PHALANX OF LEFT RING FINGER, INITIAL ENCOUNTER FOR OPEN FRACTURE: ICD-10-CM

## 2018-07-12 DIAGNOSIS — S62.665B NONDISPLACED FRACTURE OF DISTAL PHALANX OF LEFT RING FINGER, INITIAL ENCOUNTER FOR OPEN FRACTURE: ICD-10-CM

## 2018-07-12 PROCEDURE — G8427 DOCREV CUR MEDS BY ELIG CLIN: HCPCS | Performed by: ORTHOPAEDIC SURGERY

## 2018-07-12 PROCEDURE — 97140 MANUAL THERAPY 1/> REGIONS: CPT

## 2018-07-12 PROCEDURE — 1036F TOBACCO NON-USER: CPT | Performed by: ORTHOPAEDIC SURGERY

## 2018-07-12 PROCEDURE — G8419 CALC BMI OUT NRM PARAM NOF/U: HCPCS | Performed by: ORTHOPAEDIC SURGERY

## 2018-07-12 PROCEDURE — 99213 OFFICE O/P EST LOW 20 MIN: CPT | Performed by: ORTHOPAEDIC SURGERY

## 2018-07-12 PROCEDURE — 97022 WHIRLPOOL THERAPY: CPT

## 2018-07-12 PROCEDURE — 97110 THERAPEUTIC EXERCISES: CPT

## 2018-07-12 PROCEDURE — 97530 THERAPEUTIC ACTIVITIES: CPT

## 2018-07-12 RX ORDER — TRAMADOL HYDROCHLORIDE 50 MG/1
50 TABLET ORAL EVERY 8 HOURS PRN
Qty: 21 TABLET | Refills: 0 | Status: SHIPPED | OUTPATIENT
Start: 2018-07-12 | End: 2018-07-19

## 2018-07-12 RX ORDER — IBUPROFEN 400 MG/1
400 TABLET ORAL EVERY 6 HOURS PRN
COMMUNITY
End: 2018-11-18
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
## 2018-07-12 NOTE — TELEPHONE ENCOUNTER
Pt seen if office today.  (7/12/18)  Requesting refill on tramadol   Last refill of tramadol on 6/26/18  F/U on 8/13/18

## 2018-07-12 NOTE — PROGRESS NOTES
motor function as evidenced by decreased time to complete 9-hole peg test and/or MRMT test by at least 10 seconds. Progressing good  6) Patient to report 100% compliance with their splint wear, care, and precautions if needed/will fabricate as needed. Progressing. Currently using LMB size L for day use and gutter PIP splint for HS on her index finger of SONJA. 7)  Patient will be knowledgeable of edema control techniques as evident with decreases from mod to none. Progressing, kinesio taping use in place with patient education  8) Patient will demonstrate a non-tender/non-adherent scar. Progressing kinesio taping and scar pad use in place with patient education  9) Patient will report ADL functions same as prior to diagnosis of fx of P2-P4 L. Slow Progress due to index finger PIP pain.      OT G-codes:  Carrying, Handling, Moving objects   (Current status): CK   (Discharge Goal): CI    Plan:   [x]  Continues Plan of care: Treatment delivered based on POC and graduated to patient's progress. Patient education continues at each visit to obtain maximum benefit from skilled OT intervention. [x]  Alter Plan of care: Continue skilled intervention at 1-2x a week for 6 weeks. 12 visits.    []  Discharge:      HANH Andrews/CHUYITA [id number]

## 2018-07-24 ENCOUNTER — HOSPITAL ENCOUNTER (OUTPATIENT)
Dept: OCCUPATIONAL THERAPY | Age: 33
Setting detail: THERAPIES SERIES
Discharge: HOME | End: 2018-07-24
Payer: COMMERCIAL

## 2018-07-24 PROCEDURE — 97140 MANUAL THERAPY 1/> REGIONS: CPT

## 2018-07-24 PROCEDURE — 97022 WHIRLPOOL THERAPY: CPT

## 2018-07-24 PROCEDURE — 97110 THERAPEUTIC EXERCISES: CPT

## 2018-07-24 PROCEDURE — 97530 THERAPEUTIC ACTIVITIES: CPT

## 2018-07-26 ENCOUNTER — HOSPITAL ENCOUNTER (OUTPATIENT)
Dept: OCCUPATIONAL THERAPY | Age: 33
Setting detail: THERAPIES SERIES
Discharge: HOME | End: 2018-07-26
Payer: COMMERCIAL

## 2018-07-26 PROCEDURE — 97530 THERAPEUTIC ACTIVITIES: CPT

## 2018-07-26 PROCEDURE — 97110 THERAPEUTIC EXERCISES: CPT

## 2018-07-26 PROCEDURE — 97022 WHIRLPOOL THERAPY: CPT

## 2018-07-26 PROCEDURE — 97140 MANUAL THERAPY 1/> REGIONS: CPT

## 2018-07-26 NOTE — PROGRESS NOTES
OCCUPATIONAL THERAPY PROGRESS NOTE      [facility] Shilpa Khan   Phone: [phone]   Fax: [phone]     Date:  2018   Initial Evaluation Date: 17     Patient Name:  Johan Machado                      :  1985     Restrictions/Precautions:  Per protocol, low fall risk  Diagnosis:  orif index prox phal. , middle, ring pin fixation of prox phalanx. L hand                                                  Insurance/Certification information:  Medicaid [employer]  Referring Physician:  Dr. Shawna Bean  Date of Surgery/Injury: 4/10/18  Plan of care signed (Y/N):  no  Visit# / total visits:  additional sessions  Completed:   Pain Level: moderate, aching, stiff,swollen    Subjective:  Seen 2/2 scheduled visits. Encouraged patient to continue HEP with focus on MP flexion and scar management. Index finger passively has improved into extension. Flexion is painful at this time. Objective: Continue POC. Increases as tolerated. Highly encourage HEP compliance. Questionable at this time due to gains able to be made in clinic. Splinting in place with daily use of LMB and HS of Gutter Splint to index PIP    INTERVENTION: COMPLETED REPS/TIME: SPECIFICS/COMMENTS:   Modality:      Fluidotherapy x 15 min    Paraffin      MHP      Estim      Manual Therapy:      Scar Massage      Soft Tissue:   x 10 min    Therapeutic Ex/Activities: Towel task x 12 reps    Gripper x 25# 100 reps     putty x 10 min Push, pull, twist, press with prolong stretches   UBE x 7 min    ROM: PROM/AROM/AAROM x 20 min CRC, joint mob, prolong holds   Flexbar x 10 x 3 sets                                                          Other:      Juan R 99 for prolong stretch           Current Assessment: Pt is making Fair progress toward stated plan of care. Mod swelling cont's. Some improved motion of uninvolved joints is noted.     LUE AROM/PROM: L Wrist ROM:   6/19/2018  7/10/2018    Wrist Flexion 0-80:                              45  60   Wrist Extension 0-70:                         30  72     L Hand ROM  Index  MCP 0-90:                                -10/10  0/38(0/75)  Index PIP 0-100:                                 -35/55  -43/80(-32/82)  -42/84(-28/85)  Index DIP 0-70:                                   0/10  -10/55(0/60)    Long  MCP 0-90:                                 -10/10  0/43(0/60)   Long PIP 0-100:                                  -25/65  0/105  Long DIP 0-70:                                    0/10  0/70    Ring  MCP 0-90:                                 -10/10  0/45   0/50(0/65)   After session  0/60(0/70)  Ring PIP 0-100:                                  -20/50  wnl  Ring DIP 0-70:                                    0/10  wnl    Little  MCP 0-90:                                 0/15  0-65  Little PIP 0-100:                                  0/55  wnl  Little DIP 0-70:                                    0/20   wnl    -Rehab Potential: Good  -Requires OT Follow Up: Yes  -Time In: 130 -Time Out: 245 Timed Treatment Minutes: 70 Minutes    Goals: 6 weeks or 18 visits   +  Additional 12 visits if needed  1) Patient will demonstrate good understanding of home program(exercises/activities/diagnosis/prognosis/goals) with good accuracy. Progressing   2) Patient will demonstrate increased active/passive range of motion of their left hand to [facility] for ADL/IADL completion. Progressing:  Index PIP, Ring MP lacking at this point    3) Patient will demonstrate increased /pinch strength of at least 10 / 5 pinch pounds of their L hand. (when protocol permitting)   Slow Progress:  /PINCH:  LUE  20#/10# RUE  85#/25#  4) Patient to report decreased pain in their affected L hand from 2-8/10 to 0-2/10 or less with resistive functional use.     Progressing  5) Increase in fine motor function as evidenced by decreased time to complete 9-hole peg test and/or MRMT test by at least 10 seconds. Progressing good  6) Patient to report 100% compliance with their splint wear, care, and precautions if needed/will fabricate as needed. Progressing. Currently using LMB size L for day use and gutter PIP splint for HS on her index finger of SONJA. 7)  Patient will be knowledgeable of edema control techniques as evident with decreases from mod to none. Progressing, kinesio taping use in place with patient education  8) Patient will demonstrate a non-tender/non-adherent scar. Progressing kinesio taping and scar pad use in place with patient education  9) Patient will report ADL functions same as prior to diagnosis of fx of P2-P4 L. Slow Progress due to index finger PIP pain.      OT G-codes:  Carrying, Handling, Moving objects   (Current status): CK   (Discharge Goal): CI    Plan:   [x]  Continues Plan of care: Treatment delivered based on POC and graduated to patient's progress. Patient education continues at each visit to obtain maximum benefit from skilled OT intervention. [x]  Alter Plan of care: Continue skilled intervention at 1-2x a week for 6 weeks. 12 visits.    []  Discharge:      HANH Hunter/CHUYITA [id number]

## 2018-07-27 DIAGNOSIS — S62.645B NONDISPLACED FRACTURE OF PROXIMAL PHALANX OF LEFT RING FINGER, INITIAL ENCOUNTER FOR OPEN FRACTURE: ICD-10-CM

## 2018-07-27 DIAGNOSIS — S62.601G: ICD-10-CM

## 2018-07-27 DIAGNOSIS — S62.665B NONDISPLACED FRACTURE OF DISTAL PHALANX OF LEFT RING FINGER, INITIAL ENCOUNTER FOR OPEN FRACTURE: Primary | ICD-10-CM

## 2018-07-27 RX ORDER — TRAMADOL HYDROCHLORIDE 50 MG/1
50 TABLET ORAL EVERY 8 HOURS PRN
Qty: 21 TABLET | Refills: 0 | Status: SHIPPED | OUTPATIENT
Start: 2018-07-27 | End: 2018-08-06 | Stop reason: SDUPTHER

## 2018-07-31 ENCOUNTER — HOSPITAL ENCOUNTER (OUTPATIENT)
Dept: OCCUPATIONAL THERAPY | Age: 33
Setting detail: THERAPIES SERIES
Discharge: HOME | End: 2018-07-31
Payer: COMMERCIAL

## 2018-07-31 PROCEDURE — 97530 THERAPEUTIC ACTIVITIES: CPT

## 2018-07-31 PROCEDURE — 97110 THERAPEUTIC EXERCISES: CPT

## 2018-07-31 PROCEDURE — 97140 MANUAL THERAPY 1/> REGIONS: CPT

## 2018-07-31 PROCEDURE — 97022 WHIRLPOOL THERAPY: CPT

## 2018-07-31 NOTE — PROGRESS NOTES
for prolong stretch           Current Assessment: Pt is making Fair progress toward stated plan of care. Mod swelling cont's. Some improved motion of uninvolved joints is noted. LUE AROM/PROM: L Wrist ROM:   6/19/2018  7/10/2018      Wrist Flexion 0-80:                              45  60   Wrist Extension 0-70:                         30  72     L Hand ROM  Index  MCP 0-90:                                -10/10  0/38(0/75)  Index PIP 0-100:                                 -35/55  -43/80(-32/82)  -42/84(-28/85)  Index DIP 0-70:                                   0/10  -10/55(0/60)    Long  MCP 0-90:                                 -10/10  0/43(0/60)   Long PIP 0-100:                                  -25/65  0/105  Long DIP 0-70:                                    0/10  0/70    Ring  MCP 0-90:                                 -10/10  0/45   0/60(0/70)  Ring PIP 0-100:                                  -20/50  wnl  Ring DIP 0-70:                                    0/10  wnl    Little  MCP 0-90:                                 0/15  0-65  Little PIP 0-100:                                  0/55  wnl  Little DIP 0-70:                                    0/20   wnl    -Rehab Potential: Good  -Requires OT Follow Up: Yes  -Time In: 10 -Time Out: 1110 Timed Treatment Minutes: 70 Minutes    Goals: 6 weeks or 18 visits   +  Additional 12 visits if needed  1) Patient will demonstrate good understanding of home program(exercises/activities/diagnosis/prognosis/goals) with good accuracy. Progressing   2) Patient will demonstrate increased active/passive range of motion of their left hand to [facility] for ADL/IADL completion.    Progressing:  Index PIP, Ring MP lacking at this point    3) Patient will demonstrate increased /pinch strength of at least 10 / 5 pinch pounds of their L hand. (when protocol permitting)   Slow Progress:  /PINCH:  LUE  20#/10# RUE  85#/25#  4) Patient to report decreased pain in their affected L

## 2018-08-06 DIAGNOSIS — S62.665B NONDISPLACED FRACTURE OF DISTAL PHALANX OF LEFT RING FINGER, INITIAL ENCOUNTER FOR OPEN FRACTURE: ICD-10-CM

## 2018-08-06 DIAGNOSIS — S62.645B NONDISPLACED FRACTURE OF PROXIMAL PHALANX OF LEFT RING FINGER, INITIAL ENCOUNTER FOR OPEN FRACTURE: ICD-10-CM

## 2018-08-06 DIAGNOSIS — S62.601G: ICD-10-CM

## 2018-08-06 RX ORDER — TRAMADOL HYDROCHLORIDE 50 MG/1
50 TABLET ORAL EVERY 8 HOURS PRN
Qty: 21 TABLET | Refills: 0 | Status: SHIPPED | OUTPATIENT
Start: 2018-08-06 | End: 2018-08-14 | Stop reason: SDUPTHER

## 2018-08-06 NOTE — TELEPHONE ENCOUNTER
Pt requesting refill, OARRS complete. Last seen on 7/12.  L ORIF 2nd proximal phalanx, CRPP of 3rd ad 4th proximal phalanx fx 3 months ago. Pt still attending therapy. F/U scheduled for 8/13.

## 2018-08-09 ENCOUNTER — HOSPITAL ENCOUNTER (OUTPATIENT)
Dept: OCCUPATIONAL THERAPY | Age: 33
Setting detail: THERAPIES SERIES
Discharge: HOME | End: 2018-08-09
Payer: COMMERCIAL

## 2018-08-09 PROCEDURE — 97022 WHIRLPOOL THERAPY: CPT

## 2018-08-09 PROCEDURE — 97530 THERAPEUTIC ACTIVITIES: CPT

## 2018-08-09 PROCEDURE — 97140 MANUAL THERAPY 1/> REGIONS: CPT

## 2018-08-09 PROCEDURE — 97110 THERAPEUTIC EXERCISES: CPT

## 2018-08-09 NOTE — PROGRESS NOTES
INITIAL 6/19/2018  7/10/2018  8/9/2018      L Hand ROM  Index  MCP 0-90:                                -10/10  0/38(0/75)     0/60(0/75)    Index PIP 0-100:                                 -35/55  -43/80(-32/82)  -42/84(-28/85)  -26/80(-9/75)*painful  Index DIP 0-70:                                   0/10  -10/55(0/60)    Long  MCP 0-90:                                 -10/10  0/43(0/60)     0/60   Long PIP 0-100:                                  -25/65  0/105  Long DIP 0-70:                                    0/10  0/70    Ring  MCP 0-90:                                 -10/10  0/45   0/60(0/70)  0/60  Ring PIP 0-100:                                  -20/50  wnl  Ring DIP 0-70:                                    0/10  wnl    Little  MCP 0-90:                                 0/15  0-65      0/70  Little PIP 0-100:                                  0/55  wnl  Little DIP 0-70:                                    0/20   wnl    -Time In: 10 -Time Out: 1110 Timed Treatment Minutes: 70 Minutes    Goals: 6 weeks or 18 visits   +  Additional 12 visits if needed  1) Patient will demonstrate good understanding of home program with good accuracy. Goal Reached  2) Patient will demonstrate increased active/passive range of motion of their left hand to [facility] for ADL/IADL completion. Maximum potential reached at this time:  Index PIP, Ring MP lacking at this point  See above    3) Patient will demonstrate increased /pinch strength of at least 10 / 5 pinch pounds of their L hand. Maximum potential reached at this time:  /PINCH:  LUE  26#/14.5# RUE  74#/24#  4) Patient to report decreased pain in their affected L hand from 2-8/10 to 0-2/10 or less with resistive functional use. Maximum potential reached at this time: Mod pain in INDEX finger  4/10  5) Increase in fine motor function as evidenced by decreased time to complete 9-hole peg test and/or MRMT test by at least 10 seconds.     Goal Reached  6) Patient to report 100% compliance with their splint wear, care, and precautions if needed/will fabricate as needed. Goal Reached. Currently using LMB size L for day use and gutter PIP splint for HS on her index finger of SONJA. 7)  Patient will be knowledgeable of edema control techniques as evident with decreases from mod to none. Goal Reached  8) Patient will demonstrate a non-tender/non-adherent scar. Maximum potential reached:  Keloid on dorsal surface of hand. 9) Patient will report ADL functions same as prior to diagnosis of fx of P2-P4 L. Maximum potential reached at this time due to index finger PIP pain. No RTW scheduled.      Plan:   []  Continues Plan of care: Treatment delivered based on POC and graduated to patient's progress. Patient education continues at each visit to obtain maximum benefit from skilled OT intervention. []  Alter Plan of care: Reduce to twice a month and continue HEP. Reassessment at next visit to assess improvement and HEP compliance. [x]  Discharge: Refer back to physician of record for consult and possibly surgical intervention. Candy Bobby, SCHAFER/L [id number]  Conferred with SCHAFER/L regarding patient's PMH, problems, progress, and POC. Continue per POC.   [place] Radha [address] [facility], OTR/L [id number]

## 2018-08-10 ENCOUNTER — TELEPHONE (OUTPATIENT)
Dept: ORTHOPEDIC SURGERY | Age: 33
End: 2018-08-10

## 2018-08-10 DIAGNOSIS — S62.665B NONDISPLACED FRACTURE OF DISTAL PHALANX OF LEFT RING FINGER, INITIAL ENCOUNTER FOR OPEN FRACTURE: Primary | ICD-10-CM

## 2018-08-13 ENCOUNTER — OFFICE VISIT (OUTPATIENT)
Dept: ORTHOPEDIC SURGERY | Age: 33
End: 2018-08-13
Payer: COMMERCIAL

## 2018-08-13 VITALS
HEIGHT: 67 IN | RESPIRATION RATE: 18 BRPM | TEMPERATURE: 98.5 F | WEIGHT: 180 LBS | HEART RATE: 72 BPM | BODY MASS INDEX: 28.25 KG/M2 | DIASTOLIC BLOOD PRESSURE: 78 MMHG | SYSTOLIC BLOOD PRESSURE: 124 MMHG

## 2018-08-13 DIAGNOSIS — M25.642 STIFFNESS OF FINGER JOINT OF LEFT HAND: Primary | ICD-10-CM

## 2018-08-13 PROCEDURE — G8419 CALC BMI OUT NRM PARAM NOF/U: HCPCS | Performed by: ORTHOPAEDIC SURGERY

## 2018-08-13 PROCEDURE — G8427 DOCREV CUR MEDS BY ELIG CLIN: HCPCS | Performed by: ORTHOPAEDIC SURGERY

## 2018-08-13 PROCEDURE — 99213 OFFICE O/P EST LOW 20 MIN: CPT | Performed by: ORTHOPAEDIC SURGERY

## 2018-08-13 PROCEDURE — 1036F TOBACCO NON-USER: CPT | Performed by: ORTHOPAEDIC SURGERY

## 2018-08-14 ENCOUNTER — HOSPITAL ENCOUNTER (OUTPATIENT)
Dept: OCCUPATIONAL THERAPY | Age: 33
Setting detail: THERAPIES SERIES
End: 2018-08-14
Payer: COMMERCIAL

## 2018-08-14 DIAGNOSIS — S62.601G: ICD-10-CM

## 2018-08-14 DIAGNOSIS — S62.665B NONDISPLACED FRACTURE OF DISTAL PHALANX OF LEFT RING FINGER, INITIAL ENCOUNTER FOR OPEN FRACTURE: ICD-10-CM

## 2018-08-14 DIAGNOSIS — S62.645B NONDISPLACED FRACTURE OF PROXIMAL PHALANX OF LEFT RING FINGER, INITIAL ENCOUNTER FOR OPEN FRACTURE: ICD-10-CM

## 2018-08-14 RX ORDER — TRAMADOL HYDROCHLORIDE 50 MG/1
50 TABLET ORAL EVERY 8 HOURS PRN
Qty: 21 TABLET | Refills: 0 | Status: SHIPPED | OUTPATIENT
Start: 2018-08-14 | End: 2018-08-24 | Stop reason: SDUPTHER

## 2018-08-24 DIAGNOSIS — S62.601G: ICD-10-CM

## 2018-08-24 DIAGNOSIS — S62.645B NONDISPLACED FRACTURE OF PROXIMAL PHALANX OF LEFT RING FINGER, INITIAL ENCOUNTER FOR OPEN FRACTURE: ICD-10-CM

## 2018-08-24 DIAGNOSIS — S62.665B NONDISPLACED FRACTURE OF DISTAL PHALANX OF LEFT RING FINGER, INITIAL ENCOUNTER FOR OPEN FRACTURE: ICD-10-CM

## 2018-08-24 RX ORDER — TRAMADOL HYDROCHLORIDE 50 MG/1
50 TABLET ORAL EVERY 8 HOURS PRN
Qty: 21 TABLET | Refills: 0 | Status: SHIPPED | OUTPATIENT
Start: 2018-08-24 | End: 2018-09-05 | Stop reason: SDUPTHER

## 2018-08-24 NOTE — TELEPHONE ENCOUNTER
Refill request  S/p L ORIF 2nd proximal phalanx, CRPP of 3rd ad 4th proximal phalanx fx 4-10-18  Last ov 8-13-18  Next ov 10-11-18  Oarrs completed  Last refill 8-14-18

## 2018-09-05 DIAGNOSIS — S62.601G: ICD-10-CM

## 2018-09-05 DIAGNOSIS — S62.665B NONDISPLACED FRACTURE OF DISTAL PHALANX OF LEFT RING FINGER, INITIAL ENCOUNTER FOR OPEN FRACTURE: Primary | ICD-10-CM

## 2018-09-05 DIAGNOSIS — S62.645B NONDISPLACED FRACTURE OF PROXIMAL PHALANX OF LEFT RING FINGER, INITIAL ENCOUNTER FOR OPEN FRACTURE: ICD-10-CM

## 2018-09-05 RX ORDER — TRAMADOL HYDROCHLORIDE 50 MG/1
50 TABLET ORAL EVERY 12 HOURS PRN
Qty: 14 TABLET | Refills: 0 | Status: SHIPPED | OUTPATIENT
Start: 2018-09-05 | End: 2018-09-14 | Stop reason: SDUPTHER

## 2018-09-05 NOTE — TELEPHONE ENCOUNTER
Pt requesting refill, OARRS complete. Last seen on 8/13/18. Left index, ring and middle finger orif on 4/10/18. F/U scheduled for 10/11/18. Tramadol every 8 hours given on 8/24/18.

## 2018-09-12 ENCOUNTER — HOSPITAL ENCOUNTER (OUTPATIENT)
Age: 33
Discharge: HOME | End: 2018-09-12
Payer: COMMERCIAL

## 2018-09-12 LAB
ALBUMIN SERPL-MCNC: 4.1 G/DL (ref 3.5–5.2)
ALP BLD-CCNC: 52 U/L (ref 35–104)
ALT SERPL-CCNC: 26 U/L (ref 0–32)
ANION GAP SERPL CALCULATED.3IONS-SCNC: 10 MMOL/L (ref 7–16)
AST SERPL-CCNC: 30 U/L (ref 0–31)
BASOPHILS ABSOLUTE: 0.04 E9/L (ref 0–0.2)
BASOPHILS RELATIVE PERCENT: 0.7 % (ref 0–2)
BILIRUB SERPL-MCNC: 0.4 MG/DL (ref 0–1.2)
BILIRUBIN DIRECT: <0.2 MG/DL (ref 0–0.3)
BILIRUBIN, INDIRECT: NORMAL MG/DL (ref 0–1)
BUN BLDV-MCNC: 7 MG/DL (ref 6–20)
CALCIUM SERPL-MCNC: 9.5 MG/DL (ref 8.6–10.2)
CHLORIDE BLD-SCNC: 104 MMOL/L (ref 98–107)
CHOLESTEROL, TOTAL: 254 MG/DL (ref 0–199)
CO2: 24 MMOL/L (ref 22–29)
CREAT SERPL-MCNC: 0.9 MG/DL (ref 0.5–1)
EOSINOPHILS ABSOLUTE: 0.09 E9/L (ref 0.05–0.5)
EOSINOPHILS RELATIVE PERCENT: 1.6 % (ref 0–6)
GFR AFRICAN AMERICAN: >60
GFR NON-AFRICAN AMERICAN: >60 ML/MIN/1.73
GLUCOSE BLD-MCNC: 103 MG/DL (ref 74–109)
HCT VFR BLD CALC: 35.9 % (ref 34–48)
HDLC SERPL-MCNC: 53 MG/DL
HEMOGLOBIN: 11.8 G/DL (ref 11.5–15.5)
IMMATURE GRANULOCYTES #: 0.01 E9/L
IMMATURE GRANULOCYTES %: 0.2 % (ref 0–5)
IRON: 65 MCG/DL (ref 37–145)
LDL CHOLESTEROL CALCULATED: 168 MG/DL (ref 0–99)
LYMPHOCYTES ABSOLUTE: 1.92 E9/L (ref 1.5–4)
LYMPHOCYTES RELATIVE PERCENT: 33.6 % (ref 20–42)
MCH RBC QN AUTO: 26.6 PG (ref 26–35)
MCHC RBC AUTO-ENTMCNC: 32.9 % (ref 32–34.5)
MCV RBC AUTO: 80.9 FL (ref 80–99.9)
MONOCYTES ABSOLUTE: 0.51 E9/L (ref 0.1–0.95)
MONOCYTES RELATIVE PERCENT: 8.9 % (ref 2–12)
NEUTROPHILS ABSOLUTE: 3.14 E9/L (ref 1.8–7.3)
NEUTROPHILS RELATIVE PERCENT: 55 % (ref 43–80)
PDW BLD-RTO: 16.4 FL (ref 11.5–15)
PLATELET # BLD: 362 E9/L (ref 130–450)
PMV BLD AUTO: 9.2 FL (ref 7–12)
POTASSIUM SERPL-SCNC: 4.7 MMOL/L (ref 3.5–5)
RBC # BLD: 4.44 E12/L (ref 3.5–5.5)
SEDIMENTATION RATE, ERYTHROCYTE: 19 MM/HR (ref 0–20)
SODIUM BLD-SCNC: 138 MMOL/L (ref 132–146)
T4 FREE: 1.03 NG/DL (ref 0.93–1.7)
TOTAL PROTEIN: 7.5 G/DL (ref 6.4–8.3)
TRIGL SERPL-MCNC: 163 MG/DL (ref 0–149)
TSH SERPL DL<=0.05 MIU/L-ACNC: 1.22 UIU/ML (ref 0.27–4.2)
URIC ACID, SERUM: 5.9 MG/DL (ref 2.4–5.7)
VITAMIN D 25-HYDROXY: 14 NG/ML (ref 30–100)
VLDLC SERPL CALC-MCNC: 33 MG/DL
WBC # BLD: 5.7 E9/L (ref 4.5–11.5)

## 2018-09-12 PROCEDURE — 84439 ASSAY OF FREE THYROXINE: CPT

## 2018-09-12 PROCEDURE — 85025 COMPLETE CBC W/AUTO DIFF WBC: CPT

## 2018-09-12 PROCEDURE — 82306 VITAMIN D 25 HYDROXY: CPT

## 2018-09-12 PROCEDURE — 80053 COMPREHEN METABOLIC PANEL: CPT

## 2018-09-12 PROCEDURE — 83540 ASSAY OF IRON: CPT

## 2018-09-12 PROCEDURE — 80061 LIPID PANEL: CPT

## 2018-09-12 PROCEDURE — 84550 ASSAY OF BLOOD/URIC ACID: CPT

## 2018-09-12 PROCEDURE — 84443 ASSAY THYROID STIM HORMONE: CPT

## 2018-09-12 PROCEDURE — 82248 BILIRUBIN DIRECT: CPT

## 2018-09-12 PROCEDURE — 85651 RBC SED RATE NONAUTOMATED: CPT

## 2018-09-12 PROCEDURE — 36415 COLL VENOUS BLD VENIPUNCTURE: CPT

## 2018-09-14 DIAGNOSIS — S62.645B NONDISPLACED FRACTURE OF PROXIMAL PHALANX OF LEFT RING FINGER, INITIAL ENCOUNTER FOR OPEN FRACTURE: ICD-10-CM

## 2018-09-14 DIAGNOSIS — S62.601G: ICD-10-CM

## 2018-09-14 DIAGNOSIS — S62.665B NONDISPLACED FRACTURE OF DISTAL PHALANX OF LEFT RING FINGER, INITIAL ENCOUNTER FOR OPEN FRACTURE: ICD-10-CM

## 2018-09-14 RX ORDER — TRAMADOL HYDROCHLORIDE 50 MG/1
50 TABLET ORAL EVERY 12 HOURS PRN
Qty: 14 TABLET | Refills: 0 | OUTPATIENT
Start: 2018-09-14 | End: 2018-09-21 | Stop reason: SDUPTHER

## 2018-09-21 DIAGNOSIS — S62.645B NONDISPLACED FRACTURE OF PROXIMAL PHALANX OF LEFT RING FINGER, INITIAL ENCOUNTER FOR OPEN FRACTURE: ICD-10-CM

## 2018-09-21 DIAGNOSIS — S62.665B NONDISPLACED FRACTURE OF DISTAL PHALANX OF LEFT RING FINGER, INITIAL ENCOUNTER FOR OPEN FRACTURE: ICD-10-CM

## 2018-09-21 DIAGNOSIS — S62.601G: ICD-10-CM

## 2018-09-21 RX ORDER — TRAMADOL HYDROCHLORIDE 50 MG/1
50 TABLET ORAL EVERY 12 HOURS PRN
Qty: 14 TABLET | Refills: 0 | Status: SHIPPED | OUTPATIENT
Start: 2018-09-21 | End: 2018-10-15 | Stop reason: SDUPTHER

## 2018-10-11 ENCOUNTER — OFFICE VISIT (OUTPATIENT)
Dept: ORTHOPEDIC SURGERY | Age: 33
End: 2018-10-11
Payer: COMMERCIAL

## 2018-10-11 VITALS
TEMPERATURE: 98.4 F | SYSTOLIC BLOOD PRESSURE: 130 MMHG | HEART RATE: 97 BPM | RESPIRATION RATE: 18 BRPM | DIASTOLIC BLOOD PRESSURE: 90 MMHG

## 2018-10-11 DIAGNOSIS — S62.601G: ICD-10-CM

## 2018-10-11 DIAGNOSIS — M25.642 STIFFNESS OF FINGER JOINT OF LEFT HAND: ICD-10-CM

## 2018-10-11 DIAGNOSIS — S62.645B NONDISPLACED FRACTURE OF PROXIMAL PHALANX OF LEFT RING FINGER, INITIAL ENCOUNTER FOR OPEN FRACTURE: ICD-10-CM

## 2018-10-11 DIAGNOSIS — S62.665B NONDISPLACED FRACTURE OF DISTAL PHALANX OF LEFT RING FINGER, INITIAL ENCOUNTER FOR OPEN FRACTURE: Primary | ICD-10-CM

## 2018-10-11 DIAGNOSIS — M79.642 LEFT HAND PAIN: ICD-10-CM

## 2018-10-11 DIAGNOSIS — M24.542 CONTRACTURE OF FINGER JOINT, LEFT: ICD-10-CM

## 2018-10-11 DIAGNOSIS — S62.663A: ICD-10-CM

## 2018-10-11 PROCEDURE — 1036F TOBACCO NON-USER: CPT | Performed by: ORTHOPAEDIC SURGERY

## 2018-10-11 PROCEDURE — G8484 FLU IMMUNIZE NO ADMIN: HCPCS | Performed by: ORTHOPAEDIC SURGERY

## 2018-10-11 PROCEDURE — 99214 OFFICE O/P EST MOD 30 MIN: CPT | Performed by: ORTHOPAEDIC SURGERY

## 2018-10-11 PROCEDURE — G8419 CALC BMI OUT NRM PARAM NOF/U: HCPCS | Performed by: ORTHOPAEDIC SURGERY

## 2018-10-11 PROCEDURE — G8427 DOCREV CUR MEDS BY ELIG CLIN: HCPCS | Performed by: ORTHOPAEDIC SURGERY

## 2018-10-11 RX ORDER — TRAMADOL HYDROCHLORIDE 50 MG/1
50 TABLET ORAL EVERY 6 HOURS PRN
COMMUNITY
End: 2018-11-18

## 2018-10-11 RX ORDER — COVID-19 ANTIGEN TEST
KIT MISCELLANEOUS
COMMUNITY
End: 2018-11-18

## 2018-10-12 DIAGNOSIS — S62.663A: ICD-10-CM

## 2018-10-12 DIAGNOSIS — S62.665B NONDISPLACED FRACTURE OF DISTAL PHALANX OF LEFT RING FINGER, INITIAL ENCOUNTER FOR OPEN FRACTURE: ICD-10-CM

## 2018-10-12 DIAGNOSIS — S62.645B NONDISPLACED FRACTURE OF PROXIMAL PHALANX OF LEFT RING FINGER, INITIAL ENCOUNTER FOR OPEN FRACTURE: ICD-10-CM

## 2018-10-15 DIAGNOSIS — S62.601G: ICD-10-CM

## 2018-10-15 DIAGNOSIS — S62.645B NONDISPLACED FRACTURE OF PROXIMAL PHALANX OF LEFT RING FINGER, INITIAL ENCOUNTER FOR OPEN FRACTURE: ICD-10-CM

## 2018-10-15 DIAGNOSIS — S62.665B NONDISPLACED FRACTURE OF DISTAL PHALANX OF LEFT RING FINGER, INITIAL ENCOUNTER FOR OPEN FRACTURE: ICD-10-CM

## 2018-10-15 RX ORDER — TRAMADOL HYDROCHLORIDE 50 MG/1
50 TABLET ORAL EVERY 12 HOURS PRN
Qty: 14 TABLET | Refills: 0 | Status: SHIPPED | OUTPATIENT
Start: 2018-10-15 | End: 2018-10-31 | Stop reason: SDUPTHER

## 2018-10-31 DIAGNOSIS — S62.665B NONDISPLACED FRACTURE OF DISTAL PHALANX OF LEFT RING FINGER, INITIAL ENCOUNTER FOR OPEN FRACTURE: ICD-10-CM

## 2018-10-31 DIAGNOSIS — S62.645B NONDISPLACED FRACTURE OF PROXIMAL PHALANX OF LEFT RING FINGER, INITIAL ENCOUNTER FOR OPEN FRACTURE: ICD-10-CM

## 2018-10-31 DIAGNOSIS — S62.601G: ICD-10-CM

## 2018-10-31 RX ORDER — TRAMADOL HYDROCHLORIDE 50 MG/1
50 TABLET ORAL EVERY 12 HOURS PRN
Qty: 14 TABLET | Refills: 0 | Status: SHIPPED | OUTPATIENT
Start: 2018-10-31 | End: 2018-12-05 | Stop reason: SDUPTHER

## 2018-11-05 ENCOUNTER — PREP FOR PROCEDURE (OUTPATIENT)
Dept: ORTHOPEDIC SURGERY | Age: 33
End: 2018-11-05

## 2018-11-05 RX ORDER — SODIUM CHLORIDE 0.9 % (FLUSH) 0.9 %
10 SYRINGE (ML) INJECTION PRN
Status: CANCELLED | OUTPATIENT
Start: 2018-11-05

## 2018-11-05 RX ORDER — SODIUM CHLORIDE 9 MG/ML
INJECTION, SOLUTION INTRAVENOUS CONTINUOUS
Status: CANCELLED | OUTPATIENT
Start: 2018-11-05

## 2018-11-05 RX ORDER — SODIUM CHLORIDE 0.9 % (FLUSH) 0.9 %
10 SYRINGE (ML) INJECTION EVERY 12 HOURS SCHEDULED
Status: CANCELLED | OUTPATIENT
Start: 2018-11-05

## 2018-11-09 DIAGNOSIS — R52 PAIN: Primary | ICD-10-CM

## 2018-11-09 RX ORDER — ACETAMINOPHEN AND CODEINE PHOSPHATE 300; 30 MG/1; MG/1
1 TABLET ORAL EVERY 12 HOURS PRN
Qty: 14 TABLET | Refills: 0 | Status: SHIPPED | OUTPATIENT
Start: 2018-11-09 | End: 2018-11-16

## 2018-11-18 ENCOUNTER — HOSPITAL ENCOUNTER (EMERGENCY)
Age: 33
Discharge: HOME | End: 2018-11-18
Payer: COMMERCIAL

## 2018-11-18 VITALS
DIASTOLIC BLOOD PRESSURE: 64 MMHG | WEIGHT: 248 LBS | OXYGEN SATURATION: 100 % | TEMPERATURE: 98.2 F | HEIGHT: 66 IN | SYSTOLIC BLOOD PRESSURE: 130 MMHG | RESPIRATION RATE: 14 BRPM | BODY MASS INDEX: 39.86 KG/M2 | HEART RATE: 75 BPM

## 2018-11-18 DIAGNOSIS — G89.29 CHRONIC BILATERAL LOW BACK PAIN WITHOUT SCIATICA: Primary | ICD-10-CM

## 2018-11-18 DIAGNOSIS — M54.50 CHRONIC BILATERAL LOW BACK PAIN WITHOUT SCIATICA: Primary | ICD-10-CM

## 2018-11-18 PROCEDURE — 99283 EMERGENCY DEPT VISIT LOW MDM: CPT

## 2018-11-18 RX ORDER — METHYLPREDNISOLONE 4 MG/1
TABLET ORAL
Qty: 1 KIT | Refills: 0 | Status: SHIPPED | OUTPATIENT
Start: 2018-11-18 | End: 2018-11-24

## 2018-11-18 RX ORDER — CYCLOBENZAPRINE HCL 10 MG
10 TABLET ORAL 3 TIMES DAILY PRN
Qty: 20 TABLET | Refills: 0 | Status: SHIPPED | OUTPATIENT
Start: 2018-11-18 | End: 2018-11-28

## 2018-11-18 RX ORDER — TRAMADOL HYDROCHLORIDE 50 MG/1
50 TABLET ORAL EVERY 6 HOURS PRN
Qty: 12 TABLET | Refills: 0 | Status: SHIPPED | OUTPATIENT
Start: 2018-11-18 | End: 2018-11-18

## 2018-11-18 RX ORDER — HYDROCODONE BITARTRATE AND ACETAMINOPHEN 5; 325 MG/1; MG/1
1 TABLET ORAL EVERY 6 HOURS PRN
Qty: 10 TABLET | Refills: 0 | Status: SHIPPED | OUTPATIENT
Start: 2018-11-18 | End: 2018-11-23

## 2018-11-18 ASSESSMENT — PAIN DESCRIPTION - PAIN TYPE: TYPE: ACUTE PAIN

## 2018-11-18 ASSESSMENT — PAIN DESCRIPTION - ORIENTATION: ORIENTATION: LOWER

## 2018-11-18 ASSESSMENT — PAIN DESCRIPTION - LOCATION: LOCATION: BACK

## 2018-11-18 ASSESSMENT — PAIN DESCRIPTION - FREQUENCY: FREQUENCY: CONTINUOUS

## 2018-11-18 ASSESSMENT — PAIN SCALES - GENERAL: PAINLEVEL_OUTOF10: 9

## 2018-11-18 ASSESSMENT — PAIN DESCRIPTION - DESCRIPTORS: DESCRIPTORS: PATIENT UNABLE TO DESCRIBE

## 2018-11-18 ASSESSMENT — PAIN DESCRIPTION - PROGRESSION: CLINICAL_PROGRESSION: NOT CHANGED

## 2018-11-18 NOTE — ED PROVIDER NOTES
Normal.    Constitutional:  Alert and oriented x4, development consistent with age, NAD  HEENT:  NC/NT. No bruising or lacerations,  Airway patent. Neck:  Normal ROM. Supple. No meningeal signs   Respiratory:  Clear to auscultation and breath sounds equal.  CV:  Regular rate and rhythm, without pathological murmurs, ectopy, gallops, or rubs. GI:  Abdomen soft, nontender, non-distended, No firm or pulsatile mass. Back: middle and lower thoracic spine and lumbar spine bilateral.             Tenderness: Mild. Swelling: no.              Range of Motion: full range with pain. Skin:  no erythema, rash or swelling noted. Distal Function:              Motor deficit: none. Sensory deficit: none. Pulse deficit: none. Extremities: Full ROM x4,  No edema or tenderness   Straight leg raising: negative bilaterally  Integument:  Normal turgor. Warm, dry, without visible rash. Neurological: CN II-XII grossly intact, Motor functions intact   Gait: normal.    Lab / Imaging Results   (All laboratory and radiology results have been personally reviewed by myself)  Labs:  No results found for this visit on 11/18/18. Imaging: All Radiology results interpreted by Radiologist unless otherwise noted. No orders to display     ED Course / Medical Decision Making   Medications - No data to display    Consult(s):  None    Procedure(s):  None    Medical Decision Making: At this time the patient is without objective evidence of an acute process requiring inpatient management. Counseling: The emergency provider has spoken with the patient/caregiver and discussed todays results, in addition to providing specific details for the plan of care and counseling regarding the diagnosis and prognosis. Questions are answered at this time and they are agreeable with the plan. Patient understands that she must follow-up with PCP.  Patient was advised to return to ED if symptoms worsen or new symptoms, such as increased pain, fever/chills, loss of bowel or bladder, or limb weakness, develop. Pt remained nontoxic, afebrile, and A&O x4 during this ED visit. They agreed with plan of care, discharge, and importance of follow-up. Pt was in no distress at discharge. Pt able to ambulate out of emergency department with no difficulty. Vitals stable. Patient was educated on newly prescribed medication. Assessment      1. Chronic bilateral low back pain without sciatica      Plan   Discharge to home  Patient condition is good    New Medications     Discharge Medication List as of 11/18/2018  3:19 PM      START taking these medications    Details   methylPREDNISolone (MEDROL, AUGUSTA,) 4 MG tablet Take by mouth., Disp-1 kit, R-0Print      cyclobenzaprine (FLEXERIL) 10 MG tablet Take 1 tablet by mouth 3 times daily as needed for Muscle spasms, Disp-20 tablet, R-0Print           Electronically signed by Bryanna Amin PA-C   DD: 11/18/18  **This report was transcribed using voice recognition software. Every effort was made to ensure accuracy; however, inadvertent computerized transcription errors may be present.     END OF ED PROVIDER NOTE        Bryanna Amin PA-C  11/18/18 5280

## 2018-12-05 DIAGNOSIS — S62.645B NONDISPLACED FRACTURE OF PROXIMAL PHALANX OF LEFT RING FINGER, INITIAL ENCOUNTER FOR OPEN FRACTURE: ICD-10-CM

## 2018-12-05 DIAGNOSIS — S62.665B NONDISPLACED FRACTURE OF DISTAL PHALANX OF LEFT RING FINGER, INITIAL ENCOUNTER FOR OPEN FRACTURE: ICD-10-CM

## 2018-12-05 DIAGNOSIS — S62.601G: ICD-10-CM

## 2018-12-05 RX ORDER — TRAMADOL HYDROCHLORIDE 50 MG/1
50 TABLET ORAL EVERY 12 HOURS PRN
Qty: 14 TABLET | Refills: 0 | Status: ON HOLD | OUTPATIENT
Start: 2018-12-05 | End: 2018-12-12 | Stop reason: HOSPADM

## 2018-12-06 NOTE — PROGRESS NOTES
[facility] PRE-ADMISSION TESTING INSTRUCTIONS    The Preadmission Testing patient is instructed accordingly using the following criteria (check applicable):    ARRIVAL INSTRUCTIONS:  [x] Parking the day of Surgery is located in the Main Entrance lot. Upon entering the door, make an immediate right to the surgery reception desk    [x] [facility] available Monday through Friday 6 am to 6 pm    [x] Bring photo ID and insurance card    [] Bring in a copy of Living will or Durable Power of  papers. [x] Please be sure to arrange for responsible adult to provide transportation to and from the hospital    [x] Please arrange for responsible adult to be with you for the 24 hour period post procedure due to having anesthesia      GENERAL INSTRUCTIONS:    [x] Nothing by mouth after midnight, including gum, candy, mints or water    [x] You may brush your teeth, but do not swallow any water    [x] Take medications as instructed with 1-2 oz of water    [x] Stop herbal supplements and vitamins 5 days prior to procedure    [x] Follow preop dosing of blood thinners per physician instructions    [] Take 1/2 dose of evening insulin, but no insulin after midnight    [] No oral diabetic medications after midnight    [] If diabetic and have low blood sugar or feel symptomatic, take 1-2oz apple juice only    [] Bring inhalers day of surgery    [] Bring C-PAP/ Bi-Pap day of surgery    [x] Bring urine specimen day of surgery    [x] Shower or bath with soap, lather and rinse well, AM of Surgery, no lotion, powders or creams to surgical site    [] Follow bowel prep as instructed per surgeon    [x] No tobacco products within 24 hours of surgery     [x] No alcohol or illegal drug use within 24 hours of surgery.     [x] Jewelry, body piercing's, eyeglasses, contact lenses and dentures are not permitted into surgery (bring cases)      [x] Please do not wear any nail polish, make up or hair

## 2018-12-12 ENCOUNTER — APPOINTMENT (OUTPATIENT)
Dept: GENERAL RADIOLOGY | Age: 33
End: 2018-12-12
Attending: ORTHOPAEDIC SURGERY
Payer: COMMERCIAL

## 2018-12-12 ENCOUNTER — ANESTHESIA (OUTPATIENT)
Dept: OPERATING ROOM | Age: 33
End: 2018-12-12
Payer: COMMERCIAL

## 2018-12-12 ENCOUNTER — ANESTHESIA EVENT (OUTPATIENT)
Dept: OPERATING ROOM | Age: 33
End: 2018-12-12
Payer: COMMERCIAL

## 2018-12-12 ENCOUNTER — HOSPITAL ENCOUNTER (OUTPATIENT)
Age: 33
Setting detail: OUTPATIENT SURGERY
Discharge: HOME | End: 2018-12-12
Attending: ORTHOPAEDIC SURGERY | Admitting: ORTHOPAEDIC SURGERY
Payer: COMMERCIAL

## 2018-12-12 VITALS
RESPIRATION RATE: 20 BRPM | BODY MASS INDEX: 39.05 KG/M2 | TEMPERATURE: 97 F | DIASTOLIC BLOOD PRESSURE: 71 MMHG | WEIGHT: 243 LBS | HEIGHT: 66 IN | HEART RATE: 60 BPM | OXYGEN SATURATION: 98 % | SYSTOLIC BLOOD PRESSURE: 122 MMHG

## 2018-12-12 VITALS — OXYGEN SATURATION: 100 % | DIASTOLIC BLOOD PRESSURE: 88 MMHG | SYSTOLIC BLOOD PRESSURE: 149 MMHG

## 2018-12-12 DIAGNOSIS — R52 PAIN: ICD-10-CM

## 2018-12-12 DIAGNOSIS — G89.18 POST-OPERATIVE PAIN: Primary | ICD-10-CM

## 2018-12-12 LAB — HCG(URINE) PREGNANCY TEST: NEGATIVE

## 2018-12-12 PROCEDURE — 3700000001 HC ADD 15 MINUTES (ANESTHESIA): Performed by: ORTHOPAEDIC SURGERY

## 2018-12-12 PROCEDURE — 6360000002 HC RX W HCPCS: Performed by: NURSE ANESTHETIST, CERTIFIED REGISTERED

## 2018-12-12 PROCEDURE — 6370000000 HC RX 637 (ALT 250 FOR IP): Performed by: ANESTHESIOLOGY

## 2018-12-12 PROCEDURE — 2500000003 HC RX 250 WO HCPCS: Performed by: NURSE ANESTHETIST, CERTIFIED REGISTERED

## 2018-12-12 PROCEDURE — 7100000010 HC PHASE II RECOVERY - FIRST 15 MIN: Performed by: ORTHOPAEDIC SURGERY

## 2018-12-12 PROCEDURE — 7100000001 HC PACU RECOVERY - ADDTL 15 MIN: Performed by: ORTHOPAEDIC SURGERY

## 2018-12-12 PROCEDURE — 2580000003 HC RX 258: Performed by: NURSE ANESTHETIST, CERTIFIED REGISTERED

## 2018-12-12 PROCEDURE — C1769 GUIDE WIRE: HCPCS | Performed by: ORTHOPAEDIC SURGERY

## 2018-12-12 PROCEDURE — 3600000013 HC SURGERY LEVEL 3 ADDTL 15MIN: Performed by: ORTHOPAEDIC SURGERY

## 2018-12-12 PROCEDURE — 2580000003 HC RX 258: Performed by: PHYSICIAN ASSISTANT

## 2018-12-12 PROCEDURE — 26520 RELEASE KNUCKLE CONTRACTURE: CPT | Performed by: ORTHOPAEDIC SURGERY

## 2018-12-12 PROCEDURE — 6360000002 HC RX W HCPCS: Performed by: ANESTHESIOLOGY

## 2018-12-12 PROCEDURE — 20680 REMOVAL OF IMPLANT DEEP: CPT | Performed by: ORTHOPAEDIC SURGERY

## 2018-12-12 PROCEDURE — 7100000000 HC PACU RECOVERY - FIRST 15 MIN: Performed by: ORTHOPAEDIC SURGERY

## 2018-12-12 PROCEDURE — 3600000003 HC SURGERY LEVEL 3 BASE: Performed by: ORTHOPAEDIC SURGERY

## 2018-12-12 PROCEDURE — 81025 URINE PREGNANCY TEST: CPT

## 2018-12-12 PROCEDURE — 88300 SURGICAL PATH GROSS: CPT

## 2018-12-12 PROCEDURE — 3209999900 FLUORO FOR SURGICAL PROCEDURES

## 2018-12-12 PROCEDURE — 6360000002 HC RX W HCPCS: Performed by: PHYSICIAN ASSISTANT

## 2018-12-12 PROCEDURE — 7100000011 HC PHASE II RECOVERY - ADDTL 15 MIN: Performed by: ORTHOPAEDIC SURGERY

## 2018-12-12 PROCEDURE — 3700000000 HC ANESTHESIA ATTENDED CARE: Performed by: ORTHOPAEDIC SURGERY

## 2018-12-12 PROCEDURE — 2709999900 HC NON-CHARGEABLE SUPPLY: Performed by: ORTHOPAEDIC SURGERY

## 2018-12-12 PROCEDURE — 2500000003 HC RX 250 WO HCPCS: Performed by: ORTHOPAEDIC SURGERY

## 2018-12-12 RX ORDER — CEFAZOLIN SODIUM 2 G/50ML
2 SOLUTION INTRAVENOUS
Status: COMPLETED | OUTPATIENT
Start: 2018-12-12 | End: 2018-12-12

## 2018-12-12 RX ORDER — FENTANYL CITRATE 50 UG/ML
INJECTION, SOLUTION INTRAMUSCULAR; INTRAVENOUS PRN
Status: DISCONTINUED | OUTPATIENT
Start: 2018-12-12 | End: 2018-12-12 | Stop reason: SDUPTHER

## 2018-12-12 RX ORDER — SODIUM CHLORIDE 9 MG/ML
INJECTION, SOLUTION INTRAVENOUS CONTINUOUS PRN
Status: DISCONTINUED | OUTPATIENT
Start: 2018-12-12 | End: 2018-12-12 | Stop reason: SDUPTHER

## 2018-12-12 RX ORDER — SODIUM CHLORIDE, SODIUM LACTATE, POTASSIUM CHLORIDE, CALCIUM CHLORIDE 600; 310; 30; 20 MG/100ML; MG/100ML; MG/100ML; MG/100ML
INJECTION, SOLUTION INTRAVENOUS CONTINUOUS PRN
Status: DISCONTINUED | OUTPATIENT
Start: 2018-12-12 | End: 2018-12-12 | Stop reason: SDUPTHER

## 2018-12-12 RX ORDER — BUPIVACAINE HYDROCHLORIDE 5 MG/ML
INJECTION, SOLUTION EPIDURAL; INTRACAUDAL PRN
Status: DISCONTINUED | OUTPATIENT
Start: 2018-12-12 | End: 2018-12-12 | Stop reason: HOSPADM

## 2018-12-12 RX ORDER — LIDOCAINE HYDROCHLORIDE 20 MG/ML
INJECTION, SOLUTION INFILTRATION; PERINEURAL PRN
Status: DISCONTINUED | OUTPATIENT
Start: 2018-12-12 | End: 2018-12-12 | Stop reason: SDUPTHER

## 2018-12-12 RX ORDER — SODIUM CHLORIDE 0.9 % (FLUSH) 0.9 %
10 SYRINGE (ML) INJECTION EVERY 12 HOURS SCHEDULED
Status: DISCONTINUED | OUTPATIENT
Start: 2018-12-12 | End: 2018-12-12 | Stop reason: HOSPADM

## 2018-12-12 RX ORDER — ONDANSETRON 2 MG/ML
INJECTION INTRAMUSCULAR; INTRAVENOUS PRN
Status: DISCONTINUED | OUTPATIENT
Start: 2018-12-12 | End: 2018-12-12 | Stop reason: SDUPTHER

## 2018-12-12 RX ORDER — DIPHENHYDRAMINE HYDROCHLORIDE 50 MG/ML
12.5 INJECTION INTRAMUSCULAR; INTRAVENOUS
Status: DISCONTINUED | OUTPATIENT
Start: 2018-12-12 | End: 2018-12-12 | Stop reason: HOSPADM

## 2018-12-12 RX ORDER — DEXAMETHASONE SODIUM PHOSPHATE 4 MG/ML
INJECTION, SOLUTION INTRA-ARTICULAR; INTRALESIONAL; INTRAMUSCULAR; INTRAVENOUS; SOFT TISSUE PRN
Status: DISCONTINUED | OUTPATIENT
Start: 2018-12-12 | End: 2018-12-12 | Stop reason: SDUPTHER

## 2018-12-12 RX ORDER — OXYCODONE HYDROCHLORIDE AND ACETAMINOPHEN 5; 325 MG/1; MG/1
1 TABLET ORAL EVERY 6 HOURS PRN
Qty: 28 TABLET | Refills: 0 | Status: SHIPPED | OUTPATIENT
Start: 2018-12-12 | End: 2018-12-18 | Stop reason: SDUPTHER

## 2018-12-12 RX ORDER — HYDROCODONE BITARTRATE AND ACETAMINOPHEN 5; 325 MG/1; MG/1
1 TABLET ORAL PRN
Status: COMPLETED | OUTPATIENT
Start: 2018-12-12 | End: 2018-12-12

## 2018-12-12 RX ORDER — MEPERIDINE HYDROCHLORIDE 25 MG/ML
12.5 INJECTION INTRAMUSCULAR; INTRAVENOUS; SUBCUTANEOUS EVERY 5 MIN PRN
Status: DISCONTINUED | OUTPATIENT
Start: 2018-12-12 | End: 2018-12-12 | Stop reason: HOSPADM

## 2018-12-12 RX ORDER — SODIUM CHLORIDE 0.9 % (FLUSH) 0.9 %
10 SYRINGE (ML) INJECTION PRN
Status: DISCONTINUED | OUTPATIENT
Start: 2018-12-12 | End: 2018-12-12 | Stop reason: HOSPADM

## 2018-12-12 RX ORDER — CEFAZOLIN SODIUM 2 G/50ML
SOLUTION INTRAVENOUS
Status: COMPLETED
Start: 2018-12-12 | End: 2018-12-12

## 2018-12-12 RX ORDER — HYDROCODONE BITARTRATE AND ACETAMINOPHEN 5; 325 MG/1; MG/1
2 TABLET ORAL PRN
Status: COMPLETED | OUTPATIENT
Start: 2018-12-12 | End: 2018-12-12

## 2018-12-12 RX ORDER — MIDAZOLAM HYDROCHLORIDE 1 MG/ML
INJECTION INTRAMUSCULAR; INTRAVENOUS PRN
Status: DISCONTINUED | OUTPATIENT
Start: 2018-12-12 | End: 2018-12-12 | Stop reason: SDUPTHER

## 2018-12-12 RX ORDER — PROPOFOL 10 MG/ML
INJECTION, EMULSION INTRAVENOUS PRN
Status: DISCONTINUED | OUTPATIENT
Start: 2018-12-12 | End: 2018-12-12 | Stop reason: SDUPTHER

## 2018-12-12 RX ORDER — SODIUM CHLORIDE 9 MG/ML
INJECTION, SOLUTION INTRAVENOUS CONTINUOUS
Status: DISCONTINUED | OUTPATIENT
Start: 2018-12-12 | End: 2018-12-12 | Stop reason: HOSPADM

## 2018-12-12 RX ADMIN — MIDAZOLAM HYDROCHLORIDE 2 MG: 1 INJECTION, SOLUTION INTRAMUSCULAR; INTRAVENOUS at 06:52

## 2018-12-12 RX ADMIN — SODIUM CHLORIDE: 9 INJECTION, SOLUTION INTRAVENOUS at 06:30

## 2018-12-12 RX ADMIN — HYDROMORPHONE HYDROCHLORIDE 0.5 MG: 1 INJECTION, SOLUTION INTRAMUSCULAR; INTRAVENOUS; SUBCUTANEOUS at 08:08

## 2018-12-12 RX ADMIN — HYDROMORPHONE HYDROCHLORIDE 0.5 MG: 1 INJECTION, SOLUTION INTRAMUSCULAR; INTRAVENOUS; SUBCUTANEOUS at 08:00

## 2018-12-12 RX ADMIN — SODIUM CHLORIDE, POTASSIUM CHLORIDE, SODIUM LACTATE AND CALCIUM CHLORIDE: 600; 310; 30; 20 INJECTION, SOLUTION INTRAVENOUS at 07:33

## 2018-12-12 RX ADMIN — CEFAZOLIN SODIUM 2 G: 2 SOLUTION INTRAVENOUS at 06:52

## 2018-12-12 RX ADMIN — DEXAMETHASONE SODIUM PHOSPHATE 10 MG: 4 INJECTION, SOLUTION INTRAMUSCULAR; INTRAVENOUS at 07:15

## 2018-12-12 RX ADMIN — HYDROCODONE BITARTRATE AND ACETAMINOPHEN 2 TABLET: 5; 325 TABLET ORAL at 09:23

## 2018-12-12 RX ADMIN — LIDOCAINE HYDROCHLORIDE 100 MG: 20 INJECTION, SOLUTION INFILTRATION; PERINEURAL at 06:59

## 2018-12-12 RX ADMIN — PROPOFOL 200 MG: 10 INJECTION, EMULSION INTRAVENOUS at 06:59

## 2018-12-12 RX ADMIN — FENTANYL CITRATE 100 MCG: 50 INJECTION, SOLUTION INTRAMUSCULAR; INTRAVENOUS at 06:52

## 2018-12-12 RX ADMIN — MEPERIDINE HYDROCHLORIDE 12.5 MG: 25 INJECTION INTRAMUSCULAR; INTRAVENOUS; SUBCUTANEOUS at 08:16

## 2018-12-12 RX ADMIN — ONDANSETRON HYDROCHLORIDE 4 MG: 2 INJECTION, SOLUTION INTRAMUSCULAR; INTRAVENOUS at 07:15

## 2018-12-12 RX ADMIN — HYDROMORPHONE HYDROCHLORIDE 0.5 MG: 1 INJECTION, SOLUTION INTRAMUSCULAR; INTRAVENOUS; SUBCUTANEOUS at 08:30

## 2018-12-12 RX ADMIN — SODIUM CHLORIDE: 9 INJECTION, SOLUTION INTRAVENOUS at 06:13

## 2018-12-12 ASSESSMENT — PULMONARY FUNCTION TESTS
PIF_VALUE: 24
PIF_VALUE: 20
PIF_VALUE: 2
PIF_VALUE: 20
PIF_VALUE: 20
PIF_VALUE: 2
PIF_VALUE: 23
PIF_VALUE: 3
PIF_VALUE: 20
PIF_VALUE: 2
PIF_VALUE: 24
PIF_VALUE: 2
PIF_VALUE: 23
PIF_VALUE: 20
PIF_VALUE: 0
PIF_VALUE: 20
PIF_VALUE: 23
PIF_VALUE: 20
PIF_VALUE: 23
PIF_VALUE: 1
PIF_VALUE: 1
PIF_VALUE: 23
PIF_VALUE: 24
PIF_VALUE: 23
PIF_VALUE: 2
PIF_VALUE: 3
PIF_VALUE: 3
PIF_VALUE: 20
PIF_VALUE: 23
PIF_VALUE: 3
PIF_VALUE: 23
PIF_VALUE: 2
PIF_VALUE: 2
PIF_VALUE: 20
PIF_VALUE: 3
PIF_VALUE: 2
PIF_VALUE: 24
PIF_VALUE: 20
PIF_VALUE: 2
PIF_VALUE: 20
PIF_VALUE: 2
PIF_VALUE: 23
PIF_VALUE: 20
PIF_VALUE: 0
PIF_VALUE: 23
PIF_VALUE: 20
PIF_VALUE: 23
PIF_VALUE: 4
PIF_VALUE: 20
PIF_VALUE: 5
PIF_VALUE: 11
PIF_VALUE: 20

## 2018-12-12 ASSESSMENT — PAIN SCALES - GENERAL
PAINLEVEL_OUTOF10: 8
PAINLEVEL_OUTOF10: 8
PAINLEVEL_OUTOF10: 10
PAINLEVEL_OUTOF10: 8
PAINLEVEL_OUTOF10: 7
PAINLEVEL_OUTOF10: 8
PAINLEVEL_OUTOF10: 8

## 2018-12-12 ASSESSMENT — PAIN DESCRIPTION - PAIN TYPE: TYPE: SURGICAL PAIN

## 2018-12-12 ASSESSMENT — PAIN DESCRIPTION - LOCATION: LOCATION: HAND

## 2018-12-12 ASSESSMENT — PAIN DESCRIPTION - DESCRIPTORS: DESCRIPTORS: DISCOMFORT

## 2018-12-12 ASSESSMENT — PAIN DESCRIPTION - ORIENTATION: ORIENTATION: LEFT

## 2018-12-12 ASSESSMENT — PAIN DESCRIPTION - ONSET: ONSET: ON-GOING

## 2018-12-12 ASSESSMENT — LIFESTYLE VARIABLES: SMOKING_STATUS: 0

## 2018-12-12 ASSESSMENT — PAIN DESCRIPTION - FREQUENCY: FREQUENCY: INTERMITTENT

## 2018-12-12 NOTE — H&P
negative  BEHAVIOR/PSYCH:  negative     PHYSICAL EXAM:    VITALS:  BP (!) 130/90 (Site: Left Lower Arm, Position: Sitting)   Pulse 97   Temp 98.4 °F (36.9 °C) (Oral)   Resp 18   CONSTITUTIONAL:  awake, alert, cooperative, no apparent distress, and appears stated age  EYES:  Lids and lashes normal, pupils equal, round and reactive to light, extra ocular muscles intact, sclera clear, conjunctiva normal  ENT:  Normocephalic, without obvious abnormality, atraumatic, sinuses nontender on palpation, external ears without lesions, oral pharynx with moist mucus membranes, tonsils without erythema or exudates, gums normal and good dentition. NECK:  Supple, symmetrical, trachea midline, no adenopathy, thyroid symmetric, not enlarged and no tenderness, skin normal  LUNGS:  cta  CARDIOVASCULAR:  rrr  ABDOMEN:  Obese, soft, nttp  CHEST/BREASTS:  deferred  GENITAL/URINARY:  deferred  NEUROLOGIC:  Awake, alert, oriented to name, place and time. Cranial nerves II-XII are grossly intact. Motor is 5 out of 5 bilaterally. Sensory is intact.   gait is normal.  MUSCULOSKELETAL:     Left upper extremity: Nontender about shoulder elbow. Well-healed scars of the dorsal aspect of the hand and index finger. Remains of the PIP joint contracture of the index finger with range of motion of approximately 20-40°. She does have intact extensor function to the index finger. MCP extension contractures of the index middle and ring fingers limiting composite flexion with MCP flexion limited to 35°. Small finger full range of motion. Radial, ulnar, median nerves are intact light touch. Mild swelling is persistent. Motor testing 5/5 grossly.  Otherwise neurovascular intact     DATA:    CBC:         Lab Results   Component Value Date     WBC 5.7 09/12/2018     RBC 4.44 09/12/2018     HGB 11.8 09/12/2018     HCT 35.9 09/12/2018     MCV 80.9 09/12/2018     MCH 26.6 09/12/2018     MCHC 32.9 09/12/2018     RDW 16.4 09/12/2018      09/12/2018

## 2018-12-12 NOTE — BRIEF OP NOTE
Brief Postoperative Note  ______________________________________________________________    Patient: Kathrynn Ahumada  YOB: 1985  MRN: [id number]  Date of Procedure: 12/12/2018    Pre-Op Diagnosis: CONTRACTURE OF FINGER JOINT LEFT     Post-Op Diagnosis: Same       Procedure(s):  LEFT INDEX FINGER HARDWARE REMOVAL, LEFT INDEX MIDDLE AND RING FINGER METACARPOPHALANGEAL JOINT RELEASE, LEFT INDEX FINGER PROXIMAL INTERPHALANGEAL JOINT RELEASE    Anesthesia: General    Surgeon(s):  Kirt García MD    Assistant: sharon ALBA pa-c    Estimated Blood Loss (mL): less than 50     Complications: None    Specimens:   ID Type Source Tests Collected by Time Destination   A : LEFT INDEX FINGER HARDWARE Hardware Hardware SURGICAL PATHOLOGY Kirt García MD 12/12/2018 4953        Implants:  * No implants in log *      Drains:      Findings:  See op note    TREVON Mayo  Date: 12/12/2018  Time: 7:55 AM

## 2018-12-12 NOTE — ANESTHESIA POSTPROCEDURE EVALUATION
Department of Anesthesiology  Postprocedure Note    Patient: Mil Dalton  MRN: [id number]  YOB: 1985  Date of evaluation: 12/12/2018  Time:  9:42 AM     Procedure Summary     Date:  12/12/18 Room / Location:  [facility] OR 04 / [facility] OR    Anesthesia Start:  0965 Anesthesia Stop:  9327    Procedure:  LEFT INDEX FINGER HARDWARE REMOVAL, LEFT INDEX MIDDLE AND RING FINGER METACARPOPHALANGEAL JOINT RELEASE, LEFT INDEX FINGER PROXIMAL INTERPHALANGEAL JOINT RELEASE (Left Hand) Diagnosis:  (CONTRACTURE OF FINGER JOINT LEFT )    Surgeon:  Delmy Higgins MD Responsible Provider:  Damon Castro MD    Anesthesia Type:  general ASA Status:  2          Anesthesia Type: general    Bassam Phase I: Bassam Score: 10    Bassam Phase II: Bassam Score: 10    Last vitals: Reviewed and per EMR flowsheets.        Anesthesia Post Evaluation    Patient location during evaluation: PACU  Patient participation: complete - patient participated  Level of consciousness: awake and alert  Airway patency: patent  Nausea & Vomiting: no nausea and no vomiting  Complications: no  Cardiovascular status: hemodynamically stable  Respiratory status: acceptable  Hydration status: euvolemic

## 2018-12-13 ENCOUNTER — HOSPITAL ENCOUNTER (OUTPATIENT)
Dept: OCCUPATIONAL THERAPY | Age: 33
Setting detail: THERAPIES SERIES
Discharge: HOME | End: 2018-12-13
Payer: COMMERCIAL

## 2018-12-13 PROCEDURE — 97166 OT EVAL MOD COMPLEX 45 MIN: CPT | Performed by: OCCUPATIONAL THERAPIST

## 2018-12-13 PROCEDURE — 97530 THERAPEUTIC ACTIVITIES: CPT | Performed by: OCCUPATIONAL THERAPIST
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
## 2018-12-17 ENCOUNTER — HOSPITAL ENCOUNTER (OUTPATIENT)
Dept: OCCUPATIONAL THERAPY | Age: 33
Setting detail: THERAPIES SERIES
Discharge: HOME | End: 2018-12-17
Payer: COMMERCIAL

## 2018-12-17 PROCEDURE — 97530 THERAPEUTIC ACTIVITIES: CPT

## 2018-12-17 PROCEDURE — 97140 MANUAL THERAPY 1/> REGIONS: CPT

## 2018-12-17 PROCEDURE — 97110 THERAPEUTIC EXERCISES: CPT

## 2018-12-17 NOTE — PROGRESS NOTES
OCCUPATIONAL THERAPY PROGRESS NOTE       SCOTT Hanson [facility]Silpa Khan   Phone: [phone]   Fax: [phone]     Date:  2018  Initial Evaluation Date:  2018    Patient Name:  Mari Gonsalez    :  1985  Restrictions/Precautions: Follow tenolysis (contracture releases) of her IF, MF, RF, SF at MCP joint level, Tenolysis of IF PIP joint protocol as well., low fall risk  Diagnosis:    S62.665B (ICD-10-CM) - Nondisplaced fracture of distal phalanx of left ring finger, initial encounter for open fracture   S62.663A (ICD-10-CM) - Nondisp fx of distal phalanx of left middle finger, init   S62.645B (ICD-10-CM) - Nondisplaced fracture of proximal phalanx of left ring finger, initial encounter for open fracture   **Pt has Tenolysis to MCP joints of IF, MF, RF, and SF. Also had tenolysis and contracture release of her IF PIP joint. Insurance/Certification information:  [facility]  Referring Physician:  Dr. Claudette Fryer, MD  Date of Surgery/Injury: 2018 date of surgery. Plan of care signed (Y/N):  No  Visit# / total visits:  if needed    Pain Level: moderate, aching, pressure, squeezing, throbbing and tight (pulling)    Subjective: \"I don't feel too much pain until I push on them. \"     Objective:  Updated POC to be completed by .     INTERVENTION: COMPLETED REPS/TIME: SPECIFICS/COMMENTS:   Modality:      Fluidotherapy      Paraffin      MHP x 15 min With AROM  SROM   Estim      Manual Therapy:      Scar Massage      Soft Tissue: x 15 min Edema control  Index PIP extension   Therapeutic Ex/Activities:      AROM/AAROM/PROM x 20 min          Putty x 10 min Push/pull                                                         ROM:                  Other:                    Assessment: Patient shows potential to progress with stated goals and will be educated on HEP and provided written handouts as needed

## 2018-12-18 ENCOUNTER — HOSPITAL ENCOUNTER (OUTPATIENT)
Dept: OCCUPATIONAL THERAPY | Age: 33
Setting detail: THERAPIES SERIES
Discharge: HOME | End: 2018-12-18
Payer: COMMERCIAL

## 2018-12-18 DIAGNOSIS — G89.18 POST-OPERATIVE PAIN: ICD-10-CM

## 2018-12-18 PROCEDURE — 97110 THERAPEUTIC EXERCISES: CPT

## 2018-12-18 PROCEDURE — 97140 MANUAL THERAPY 1/> REGIONS: CPT

## 2018-12-18 RX ORDER — OXYCODONE HYDROCHLORIDE AND ACETAMINOPHEN 5; 325 MG/1; MG/1
1 TABLET ORAL EVERY 6 HOURS PRN
Qty: 28 TABLET | Refills: 0 | Status: SHIPPED | OUTPATIENT
Start: 2018-12-18 | End: 2018-12-20 | Stop reason: SDUPTHER

## 2018-12-19 ENCOUNTER — TELEPHONE (OUTPATIENT)
Dept: ORTHOPEDIC SURGERY | Age: 33
End: 2018-12-19

## 2018-12-19 ENCOUNTER — HOSPITAL ENCOUNTER (OUTPATIENT)
Dept: OCCUPATIONAL THERAPY | Age: 33
Setting detail: THERAPIES SERIES
Discharge: HOME | End: 2018-12-19
Payer: COMMERCIAL

## 2018-12-19 DIAGNOSIS — S62.665B NONDISPLACED FRACTURE OF DISTAL PHALANX OF LEFT RING FINGER, INITIAL ENCOUNTER FOR OPEN FRACTURE: Primary | ICD-10-CM

## 2018-12-19 PROCEDURE — 97140 MANUAL THERAPY 1/> REGIONS: CPT

## 2018-12-19 PROCEDURE — 97110 THERAPEUTIC EXERCISES: CPT

## 2018-12-19 NOTE — PROGRESS NOTES
OCCUPATIONAL THERAPY PROGRESS NOTE       SCOTT Hanson Shilpa Khan   Phone: [phone]   Fax: [phone]     Date:  2018  Initial Evaluation Date:  2018    Patient Name:  Michelle Omalley    :  1985  Restrictions/Precautions: Follow tenolysis (contracture releases) of her IF, MF, RF, SF at MCP joint level, Tenolysis of IF PIP joint protocol as well., low fall risk  Diagnosis:    S62.665B (ICD-10-CM) - Nondisplaced fracture of distal phalanx of left ring finger, initial encounter for open fracture     S62.663A (ICD-10-CM) - Nondisp fx of distal phalanx of left middle finger, init   S62.645B (ICD-10-CM) - Nondisplaced fracture of proximal phalanx of left ring finger, initial encounter for open fracture   **Pt has Tenolysis to MCP joints of IF, MF, RF, and SF. Also had tenolysis and contracture release of her IF PIP joint. Insurance/Certification information:  [facility]  Referring Physician:  Dr. Chelsea Tatum MD  Date of Surgery/Injury: 2018 date of surgery. Plan of care signed (Y/N):  No  Visit# / total visits:  if needed    Pain Level: moderate, aching, pressure, squeezing, throbbing and tight (pulling)    Subjective: Seen 3/4 visits this week. Reports some tenderness, pain mostly in index and middle finger, and the dorsal surface of hand. Able to tolerate moderate prolong stretch today. Objective:  Updated POC to be completed by 10th. Engaged in tendolysis protocol with soft end range noted.     INTERVENTION: COMPLETED REPS/TIME: SPECIFICS/COMMENTS:   Modality:      Fluidotherapy      Paraffin      MHP x 15 min With AROM  SROM   Estim      Manual Therapy:      Scar Massage      Soft Tissue: x 15 min Edema control  Index PIP extension   Therapeutic Ex/Activities:      AROM/AAROM/PROM x 20 min          Putty x 10 min Push/pull

## 2018-12-20 ENCOUNTER — HOSPITAL ENCOUNTER (OUTPATIENT)
Dept: OCCUPATIONAL THERAPY | Age: 33
Setting detail: THERAPIES SERIES
Discharge: HOME | End: 2018-12-20
Payer: COMMERCIAL

## 2018-12-20 ENCOUNTER — OFFICE VISIT (OUTPATIENT)
Dept: ORTHOPEDIC SURGERY | Age: 33
End: 2018-12-20

## 2018-12-20 VITALS
TEMPERATURE: 98.2 F | RESPIRATION RATE: 16 BRPM | HEART RATE: 80 BPM | SYSTOLIC BLOOD PRESSURE: 130 MMHG | DIASTOLIC BLOOD PRESSURE: 80 MMHG

## 2018-12-20 DIAGNOSIS — G89.18 POST-OPERATIVE PAIN: ICD-10-CM

## 2018-12-20 PROCEDURE — 97140 MANUAL THERAPY 1/> REGIONS: CPT

## 2018-12-20 PROCEDURE — 97530 THERAPEUTIC ACTIVITIES: CPT

## 2018-12-20 PROCEDURE — 99024 POSTOP FOLLOW-UP VISIT: CPT | Performed by: ORTHOPAEDIC SURGERY

## 2018-12-20 PROCEDURE — 97110 THERAPEUTIC EXERCISES: CPT

## 2018-12-20 RX ORDER — IBUPROFEN 600 MG/1
600 TABLET ORAL 4 TIMES DAILY PRN
Qty: 120 TABLET | Refills: 5 | Status: SHIPPED | OUTPATIENT
Start: 2018-12-20 | End: 2019-03-12 | Stop reason: ALTCHOICE

## 2018-12-20 RX ORDER — METHYLPREDNISOLONE 4 MG/1
TABLET ORAL
Qty: 1 KIT | Refills: 0 | Status: SHIPPED | OUTPATIENT
Start: 2018-12-20 | End: 2018-12-26

## 2018-12-20 RX ORDER — OXYCODONE HYDROCHLORIDE AND ACETAMINOPHEN 5; 325 MG/1; MG/1
1 TABLET ORAL EVERY 6 HOURS PRN
Qty: 28 TABLET | Refills: 0 | Status: SHIPPED | OUTPATIENT
Start: 2018-12-24 | End: 2018-12-31 | Stop reason: SDUPTHER

## 2018-12-24 ENCOUNTER — HOSPITAL ENCOUNTER (OUTPATIENT)
Dept: OCCUPATIONAL THERAPY | Age: 33
Setting detail: THERAPIES SERIES
Discharge: HOME | End: 2018-12-24
Payer: COMMERCIAL

## 2018-12-24 PROCEDURE — 97140 MANUAL THERAPY 1/> REGIONS: CPT

## 2018-12-24 PROCEDURE — 97110 THERAPEUTIC EXERCISES: CPT

## 2018-12-27 ENCOUNTER — HOSPITAL ENCOUNTER (OUTPATIENT)
Dept: OCCUPATIONAL THERAPY | Age: 33
Setting detail: THERAPIES SERIES
Discharge: HOME | End: 2018-12-27
Payer: COMMERCIAL

## 2018-12-27 PROCEDURE — 97530 THERAPEUTIC ACTIVITIES: CPT

## 2018-12-27 PROCEDURE — 97110 THERAPEUTIC EXERCISES: CPT

## 2018-12-27 PROCEDURE — 97140 MANUAL THERAPY 1/> REGIONS: CPT

## 2018-12-27 PROCEDURE — 97760 ORTHOTIC MGMT&TRAING 1ST ENC: CPT

## 2018-12-27 NOTE — PROGRESS NOTES
Ex/Activities:      AROM/AAROM/PROM x 20 min          Putty  10 min Push/pull                                                         ROM:                  Other:      LMB x  lg blk   Scar pad x  Instructions     Assessment: Patient shows potential to progress with stated goals and will be educated on HEP and provided written handouts as needed throughout their care. Pt is making Fair + progress toward stated plan of care. -Rehab Potential: Good Requires OT Follow Up: Yes-Time In: 1030-Time Out: 1130   Timed Treatment Minutes: 60 Minutes   Left Hand AROM  Initial  12/17 12/18  Index  MCP 0-90:                                29* 46 61  Index PIP 0-100:                                 84*  Index DIP 0-70:                                   34*  Long  MCP 0-90:                                 45* 61 65  Long PIP 0-100:                                  100*  Long DIP 0-70:                                    70*  Ring  MCP 0-90:                                 35* 62 65  Ring PIP 0-100:                                  94*  Ring DIP 0-70:                                    70*  Little  MCP 0-90:                                 40* 49 67  Little PIP 0-100:                                  92*  Little DIP 0-70:                                    70*      Goals: 18 visits if needed  1) Patient will demonstrate good understanding of home program (exercises/activities/diagnosis/prognosis/goals) with good accuracy. 2) Patient will demonstrate increased active/passive range of motion of their L digits by at least 10* all applicable joints for ADL/IADL completion. 3) Patient will demonstrate increased /pinch strength of at least 10 / 5 pinch pounds of their Left hand to be measured when appropriate. 4) Patient to report decreased pain in their affected Left upper extremity from 6/10 to 3/10 or less with resistive functional use.    5) Patient to report 100% compliance with their splint wear, care, and precautions if

## 2018-12-31 ENCOUNTER — HOSPITAL ENCOUNTER (OUTPATIENT)
Dept: OCCUPATIONAL THERAPY | Age: 33
Setting detail: THERAPIES SERIES
Discharge: HOME | End: 2018-12-31
Payer: COMMERCIAL

## 2018-12-31 DIAGNOSIS — G89.18 POST-OPERATIVE PAIN: ICD-10-CM

## 2018-12-31 PROCEDURE — 97110 THERAPEUTIC EXERCISES: CPT | Performed by: OCCUPATIONAL THERAPIST

## 2018-12-31 PROCEDURE — 97140 MANUAL THERAPY 1/> REGIONS: CPT | Performed by: OCCUPATIONAL THERAPIST

## 2018-12-31 PROCEDURE — 97022 WHIRLPOOL THERAPY: CPT | Performed by: OCCUPATIONAL THERAPIST

## 2018-12-31 RX ORDER — OXYCODONE HYDROCHLORIDE AND ACETAMINOPHEN 5; 325 MG/1; MG/1
1 TABLET ORAL EVERY 6 HOURS PRN
Qty: 28 TABLET | Refills: 0 | Status: SHIPPED | OUTPATIENT
Start: 2018-12-31 | End: 2019-01-09 | Stop reason: SDUPTHER

## 2019-01-02 ENCOUNTER — HOSPITAL ENCOUNTER (OUTPATIENT)
Dept: OCCUPATIONAL THERAPY | Age: 34
Setting detail: THERAPIES SERIES
End: 2019-01-02
Payer: COMMERCIAL

## 2019-01-03 ENCOUNTER — HOSPITAL ENCOUNTER (OUTPATIENT)
Dept: OCCUPATIONAL THERAPY | Age: 34
Setting detail: THERAPIES SERIES
Discharge: HOME | End: 2019-01-03
Payer: COMMERCIAL

## 2019-01-03 PROCEDURE — 97110 THERAPEUTIC EXERCISES: CPT

## 2019-01-03 PROCEDURE — 97022 WHIRLPOOL THERAPY: CPT

## 2019-01-03 PROCEDURE — 97140 MANUAL THERAPY 1/> REGIONS: CPT

## 2019-01-03 PROCEDURE — 97530 THERAPEUTIC ACTIVITIES: CPT

## 2019-01-04 ENCOUNTER — HOSPITAL ENCOUNTER (OUTPATIENT)
Dept: OCCUPATIONAL THERAPY | Age: 34
Setting detail: THERAPIES SERIES
End: 2019-01-04
Payer: COMMERCIAL

## 2019-01-09 DIAGNOSIS — G89.18 POST-OPERATIVE PAIN: ICD-10-CM

## 2019-01-09 RX ORDER — OXYCODONE HYDROCHLORIDE AND ACETAMINOPHEN 5; 325 MG/1; MG/1
1 TABLET ORAL EVERY 6 HOURS PRN
Qty: 28 TABLET | Refills: 0 | Status: SHIPPED | OUTPATIENT
Start: 2019-01-09 | End: 2019-01-16

## 2019-01-16 DIAGNOSIS — G89.18 POST-OPERATIVE PAIN: ICD-10-CM

## 2019-01-17 DIAGNOSIS — R52 PAIN: Primary | ICD-10-CM

## 2019-01-17 RX ORDER — OXYCODONE HYDROCHLORIDE AND ACETAMINOPHEN 5; 325 MG/1; MG/1
1 TABLET ORAL EVERY 8 HOURS PRN
Qty: 21 TABLET | Refills: 0 | OUTPATIENT
Start: 2019-01-17 | End: 2019-01-24

## 2019-01-17 RX ORDER — HYDROCODONE BITARTRATE AND ACETAMINOPHEN 5; 325 MG/1; MG/1
1 TABLET ORAL EVERY 8 HOURS PRN
Qty: 21 TABLET | Refills: 0 | Status: SHIPPED | OUTPATIENT
Start: 2019-01-17 | End: 2019-01-24 | Stop reason: SDUPTHER

## 2019-01-23 ENCOUNTER — TELEPHONE (OUTPATIENT)
Dept: ORTHOPEDIC SURGERY | Age: 34
End: 2019-01-23

## 2019-01-23 DIAGNOSIS — S62.665B NONDISPLACED FRACTURE OF DISTAL PHALANX OF LEFT RING FINGER, INITIAL ENCOUNTER FOR OPEN FRACTURE: Primary | ICD-10-CM

## 2019-01-24 ENCOUNTER — OFFICE VISIT (OUTPATIENT)
Dept: ORTHOPEDIC SURGERY | Age: 34
End: 2019-01-24

## 2019-01-24 VITALS — DIASTOLIC BLOOD PRESSURE: 78 MMHG | RESPIRATION RATE: 16 BRPM | HEART RATE: 75 BPM | SYSTOLIC BLOOD PRESSURE: 112 MMHG

## 2019-01-24 DIAGNOSIS — S62.663A: ICD-10-CM

## 2019-01-24 DIAGNOSIS — S62.645B NONDISPLACED FRACTURE OF PROXIMAL PHALANX OF LEFT RING FINGER, INITIAL ENCOUNTER FOR OPEN FRACTURE: ICD-10-CM

## 2019-01-24 DIAGNOSIS — R52 PAIN: ICD-10-CM

## 2019-01-24 DIAGNOSIS — S62.665B NONDISPLACED FRACTURE OF DISTAL PHALANX OF LEFT RING FINGER, INITIAL ENCOUNTER FOR OPEN FRACTURE: ICD-10-CM

## 2019-01-24 PROCEDURE — 99024 POSTOP FOLLOW-UP VISIT: CPT | Performed by: ORTHOPAEDIC SURGERY

## 2019-01-24 RX ORDER — HYDROCODONE BITARTRATE AND ACETAMINOPHEN 5; 325 MG/1; MG/1
1 TABLET ORAL EVERY 8 HOURS PRN
Qty: 21 TABLET | Refills: 0 | Status: SHIPPED | OUTPATIENT
Start: 2019-01-24 | End: 2019-01-31

## 2019-01-24 RX ORDER — GABAPENTIN 300 MG/1
300 CAPSULE ORAL 3 TIMES DAILY
Qty: 90 CAPSULE | Refills: 3 | Status: SHIPPED | OUTPATIENT
Start: 2019-01-24 | End: 2019-03-12 | Stop reason: ALTCHOICE

## 2019-01-24 RX ORDER — METHYLPREDNISOLONE 4 MG/1
TABLET ORAL
Qty: 1 KIT | Refills: 0 | Status: SHIPPED | OUTPATIENT
Start: 2019-01-24 | End: 2019-01-30

## 2019-02-05 DIAGNOSIS — S62.645B NONDISPLACED FRACTURE OF PROXIMAL PHALANX OF LEFT RING FINGER, INITIAL ENCOUNTER FOR OPEN FRACTURE: ICD-10-CM

## 2019-02-05 DIAGNOSIS — S62.665B NONDISPLACED FRACTURE OF DISTAL PHALANX OF LEFT RING FINGER, INITIAL ENCOUNTER FOR OPEN FRACTURE: ICD-10-CM

## 2019-02-05 DIAGNOSIS — S62.663A: ICD-10-CM

## 2019-02-05 RX ORDER — ACETAMINOPHEN AND CODEINE PHOSPHATE 300; 30 MG/1; MG/1
1 TABLET ORAL EVERY 6 HOURS PRN
Qty: 28 TABLET | Refills: 0 | Status: SHIPPED | OUTPATIENT
Start: 2019-02-05 | End: 2019-02-21 | Stop reason: SDUPTHER

## 2019-02-21 DIAGNOSIS — S62.645B NONDISPLACED FRACTURE OF PROXIMAL PHALANX OF LEFT RING FINGER, INITIAL ENCOUNTER FOR OPEN FRACTURE: ICD-10-CM

## 2019-02-21 DIAGNOSIS — S62.663A: ICD-10-CM

## 2019-02-21 DIAGNOSIS — S62.665B NONDISPLACED FRACTURE OF DISTAL PHALANX OF LEFT RING FINGER, INITIAL ENCOUNTER FOR OPEN FRACTURE: ICD-10-CM

## 2019-02-21 RX ORDER — ACETAMINOPHEN AND CODEINE PHOSPHATE 300; 30 MG/1; MG/1
1 TABLET ORAL EVERY 6 HOURS PRN
Qty: 28 TABLET | Refills: 0 | Status: SHIPPED | OUTPATIENT
Start: 2019-02-21 | End: 2019-02-28

## 2019-03-12 ENCOUNTER — HOSPITAL ENCOUNTER (EMERGENCY)
Age: 34
Discharge: HOME | End: 2019-03-12
Payer: COMMERCIAL

## 2019-03-12 ENCOUNTER — APPOINTMENT (OUTPATIENT)
Dept: CT IMAGING | Age: 34
End: 2019-03-12
Payer: COMMERCIAL

## 2019-03-12 VITALS
HEART RATE: 82 BPM | SYSTOLIC BLOOD PRESSURE: 139 MMHG | DIASTOLIC BLOOD PRESSURE: 74 MMHG | TEMPERATURE: 98 F | BODY MASS INDEX: 33.74 KG/M2 | RESPIRATION RATE: 16 BRPM | OXYGEN SATURATION: 99 % | WEIGHT: 215 LBS | HEIGHT: 67 IN

## 2019-03-12 DIAGNOSIS — S62.663A: ICD-10-CM

## 2019-03-12 DIAGNOSIS — R51.9 NONINTRACTABLE HEADACHE, UNSPECIFIED CHRONICITY PATTERN, UNSPECIFIED HEADACHE TYPE: Primary | ICD-10-CM

## 2019-03-12 DIAGNOSIS — S62.645B NONDISPLACED FRACTURE OF PROXIMAL PHALANX OF LEFT RING FINGER, INITIAL ENCOUNTER FOR OPEN FRACTURE: ICD-10-CM

## 2019-03-12 DIAGNOSIS — S62.665B NONDISPLACED FRACTURE OF DISTAL PHALANX OF LEFT RING FINGER, INITIAL ENCOUNTER FOR OPEN FRACTURE: ICD-10-CM

## 2019-03-12 PROCEDURE — 96374 THER/PROPH/DIAG INJ IV PUSH: CPT

## 2019-03-12 PROCEDURE — 70450 CT HEAD/BRAIN W/O DYE: CPT

## 2019-03-12 PROCEDURE — 6360000002 HC RX W HCPCS: Performed by: PHYSICIAN ASSISTANT

## 2019-03-12 PROCEDURE — 96375 TX/PRO/DX INJ NEW DRUG ADDON: CPT

## 2019-03-12 PROCEDURE — 2580000003 HC RX 258: Performed by: PHYSICIAN ASSISTANT

## 2019-03-12 PROCEDURE — 99284 EMERGENCY DEPT VISIT MOD MDM: CPT

## 2019-03-12 RX ORDER — 0.9 % SODIUM CHLORIDE 0.9 %
1000 INTRAVENOUS SOLUTION INTRAVENOUS ONCE
Status: COMPLETED | OUTPATIENT
Start: 2019-03-12 | End: 2019-03-12

## 2019-03-12 RX ORDER — DIPHENHYDRAMINE HYDROCHLORIDE 50 MG/ML
25 INJECTION INTRAMUSCULAR; INTRAVENOUS ONCE
Status: COMPLETED | OUTPATIENT
Start: 2019-03-12 | End: 2019-03-12

## 2019-03-12 RX ORDER — ONDANSETRON 2 MG/ML
4 INJECTION INTRAMUSCULAR; INTRAVENOUS ONCE
Status: COMPLETED | OUTPATIENT
Start: 2019-03-12 | End: 2019-03-12

## 2019-03-12 RX ORDER — ACETAMINOPHEN AND CODEINE PHOSPHATE 300; 30 MG/1; MG/1
1 TABLET ORAL EVERY 6 HOURS PRN
Qty: 28 TABLET | Refills: 0 | OUTPATIENT
Start: 2019-03-12 | End: 2019-03-19

## 2019-03-12 RX ORDER — KETOROLAC TROMETHAMINE 10 MG/1
10 TABLET, FILM COATED ORAL EVERY 8 HOURS PRN
Qty: 20 TABLET | Refills: 0 | Status: SHIPPED | OUTPATIENT
Start: 2019-03-12 | End: 2019-03-15

## 2019-03-12 RX ORDER — KETOROLAC TROMETHAMINE 30 MG/ML
30 INJECTION, SOLUTION INTRAMUSCULAR; INTRAVENOUS ONCE
Status: COMPLETED | OUTPATIENT
Start: 2019-03-12 | End: 2019-03-12

## 2019-03-12 RX ADMIN — ONDANSETRON 4 MG: 2 INJECTION INTRAMUSCULAR; INTRAVENOUS at 14:40

## 2019-03-12 RX ADMIN — KETOROLAC TROMETHAMINE 30 MG: 30 INJECTION, SOLUTION INTRAMUSCULAR at 14:40

## 2019-03-12 RX ADMIN — DIPHENHYDRAMINE HYDROCHLORIDE 25 MG: 50 INJECTION, SOLUTION INTRAMUSCULAR; INTRAVENOUS at 14:40

## 2019-03-12 RX ADMIN — SODIUM CHLORIDE 1000 ML: 9 INJECTION, SOLUTION INTRAVENOUS at 14:40

## 2019-03-12 ASSESSMENT — PAIN DESCRIPTION - LOCATION: LOCATION: HEAD

## 2019-03-12 ASSESSMENT — PAIN DESCRIPTION - PAIN TYPE
TYPE: ACUTE PAIN
TYPE: ACUTE PAIN

## 2019-03-12 ASSESSMENT — PAIN DESCRIPTION - ORIENTATION: ORIENTATION: LEFT

## 2019-03-12 ASSESSMENT — PAIN - FUNCTIONAL ASSESSMENT: PAIN_FUNCTIONAL_ASSESSMENT: 0-10

## 2019-03-12 ASSESSMENT — PAIN SCALES - GENERAL
PAINLEVEL_OUTOF10: 4
PAINLEVEL_OUTOF10: 5
PAINLEVEL_OUTOF10: 9
PAINLEVEL_OUTOF10: 10

## 2019-03-12 ASSESSMENT — PAIN SCALES - WONG BAKER: WONGBAKER_NUMERICALRESPONSE: 2

## 2019-03-12 ASSESSMENT — PAIN DESCRIPTION - DESCRIPTORS: DESCRIPTORS: ACHING

## 2019-03-15 ENCOUNTER — APPOINTMENT (OUTPATIENT)
Dept: CT IMAGING | Age: 34
End: 2019-03-15
Payer: COMMERCIAL

## 2019-03-15 ENCOUNTER — APPOINTMENT (OUTPATIENT)
Dept: GENERAL RADIOLOGY | Age: 34
End: 2019-03-15
Payer: COMMERCIAL

## 2019-03-15 ENCOUNTER — HOSPITAL ENCOUNTER (EMERGENCY)
Age: 34
Discharge: HOME | End: 2019-03-16
Attending: EMERGENCY MEDICINE
Payer: COMMERCIAL

## 2019-03-15 VITALS
BODY MASS INDEX: 33.6 KG/M2 | TEMPERATURE: 98.2 F | HEIGHT: 71 IN | WEIGHT: 240 LBS | RESPIRATION RATE: 18 BRPM | DIASTOLIC BLOOD PRESSURE: 80 MMHG | SYSTOLIC BLOOD PRESSURE: 123 MMHG | HEART RATE: 98 BPM | OXYGEN SATURATION: 100 %

## 2019-03-15 DIAGNOSIS — R51.9 HEADACHE, UNSPECIFIED HEADACHE TYPE: Primary | ICD-10-CM

## 2019-03-15 DIAGNOSIS — T50.905A ADVERSE EFFECT OF DRUG, INITIAL ENCOUNTER: ICD-10-CM

## 2019-03-15 DIAGNOSIS — R00.2 PALPITATIONS: ICD-10-CM

## 2019-03-15 LAB
ACETAMINOPHEN LEVEL: <5 MCG/ML (ref 10–30)
ALBUMIN SERPL-MCNC: 4.1 G/DL (ref 3.5–5.2)
ALP BLD-CCNC: 59 U/L (ref 35–104)
ALT SERPL-CCNC: 26 U/L (ref 0–32)
AMPHETAMINE SCREEN, URINE: NOT DETECTED
ANION GAP SERPL CALCULATED.3IONS-SCNC: 13 MMOL/L (ref 7–16)
AST SERPL-CCNC: 22 U/L (ref 0–31)
BACTERIA: ABNORMAL /HPF
BARBITURATE SCREEN URINE: NOT DETECTED
BENZODIAZEPINE SCREEN, URINE: NOT DETECTED
BILIRUB SERPL-MCNC: 0.3 MG/DL (ref 0–1.2)
BILIRUBIN URINE: NEGATIVE
BLOOD, URINE: ABNORMAL
BUN BLDV-MCNC: 8 MG/DL (ref 6–20)
CALCIUM SERPL-MCNC: 9.1 MG/DL (ref 8.6–10.2)
CANNABINOID SCREEN URINE: NOT DETECTED
CHLORIDE BLD-SCNC: 102 MMOL/L (ref 98–107)
CLARITY: CLEAR
CO2: 24 MMOL/L (ref 22–29)
COCAINE METABOLITE SCREEN URINE: NOT DETECTED
COLOR: YELLOW
CREAT SERPL-MCNC: 0.9 MG/DL (ref 0.5–1)
ETHANOL: <10 MG/DL (ref 0–0.08)
GFR AFRICAN AMERICAN: >60
GFR NON-AFRICAN AMERICAN: >60 ML/MIN/1.73
GLUCOSE BLD-MCNC: 106 MG/DL (ref 74–99)
GLUCOSE URINE: NEGATIVE MG/DL
HCG, URINE, POC: NEGATIVE
HCT VFR BLD CALC: 34.6 % (ref 34–48)
HEMOGLOBIN: 11 G/DL (ref 11.5–15.5)
KETONES, URINE: NEGATIVE MG/DL
LEUKOCYTE ESTERASE, URINE: NEGATIVE
Lab: NORMAL
MCH RBC QN AUTO: 23.9 PG (ref 26–35)
MCHC RBC AUTO-ENTMCNC: 31.8 % (ref 32–34.5)
MCV RBC AUTO: 75.2 FL (ref 80–99.9)
METHADONE SCREEN, URINE: NOT DETECTED
NEGATIVE QC PASS/FAIL: NORMAL
NITRITE, URINE: NEGATIVE
OPIATE SCREEN URINE: NOT DETECTED
PDW BLD-RTO: 16.9 FL (ref 11.5–15)
PH UA: 5.5 (ref 5–9)
PHENCYCLIDINE SCREEN URINE: NOT DETECTED
PLATELET # BLD: 411 E9/L (ref 130–450)
PMV BLD AUTO: 8.9 FL (ref 7–12)
POSITIVE QC PASS/FAIL: NORMAL
POTASSIUM SERPL-SCNC: 4 MMOL/L (ref 3.5–5)
PROPOXYPHENE SCREEN: NOT DETECTED
PROTEIN UA: 100 MG/DL
RBC # BLD: 4.6 E12/L (ref 3.5–5.5)
RBC UA: ABNORMAL /HPF (ref 0–2)
REASON FOR REJECTION: NORMAL
REJECTED TEST: NORMAL
SALICYLATE, SERUM: <0.3 MG/DL (ref 0–30)
SODIUM BLD-SCNC: 139 MMOL/L (ref 132–146)
SPECIFIC GRAVITY UA: 1.02 (ref 1–1.03)
TOTAL PROTEIN: 7.7 G/DL (ref 6.4–8.3)
TRICYCLIC ANTIDEPRESSANTS SCREEN SERUM: NEGATIVE NG/ML
TROPONIN: <0.01 NG/ML (ref 0–0.03)
UROBILINOGEN, URINE: 0.2 E.U./DL
WBC # BLD: 7.9 E9/L (ref 4.5–11.5)
WBC UA: ABNORMAL /HPF (ref 0–5)

## 2019-03-15 PROCEDURE — 85027 COMPLETE CBC AUTOMATED: CPT

## 2019-03-15 PROCEDURE — G0480 DRUG TEST DEF 1-7 CLASSES: HCPCS

## 2019-03-15 PROCEDURE — 71045 X-RAY EXAM CHEST 1 VIEW: CPT

## 2019-03-15 PROCEDURE — 81001 URINALYSIS AUTO W/SCOPE: CPT

## 2019-03-15 PROCEDURE — 36415 COLL VENOUS BLD VENIPUNCTURE: CPT

## 2019-03-15 PROCEDURE — 80307 DRUG TEST PRSMV CHEM ANLYZR: CPT

## 2019-03-15 PROCEDURE — 93005 ELECTROCARDIOGRAM TRACING: CPT | Performed by: EMERGENCY MEDICINE

## 2019-03-15 PROCEDURE — 80053 COMPREHEN METABOLIC PANEL: CPT

## 2019-03-15 PROCEDURE — 84484 ASSAY OF TROPONIN QUANT: CPT

## 2019-03-15 PROCEDURE — 70450 CT HEAD/BRAIN W/O DYE: CPT

## 2019-03-15 PROCEDURE — 99285 EMERGENCY DEPT VISIT HI MDM: CPT

## 2019-03-15 PROCEDURE — 85378 FIBRIN DEGRADE SEMIQUANT: CPT

## 2019-03-16 LAB
D DIMER: 256 NG/ML DDU
EKG ATRIAL RATE: 91 BPM
EKG P AXIS: 35 DEGREES
EKG P-R INTERVAL: 172 MS
EKG Q-T INTERVAL: 374 MS
EKG QRS DURATION: 80 MS
EKG QTC CALCULATION (BAZETT): 460 MS
EKG R AXIS: 70 DEGREES
EKG T AXIS: 40 DEGREES
EKG VENTRICULAR RATE: 91 BPM

## 2019-03-25 ENCOUNTER — OFFICE VISIT (OUTPATIENT)
Dept: ORTHOPEDIC SURGERY | Age: 34
End: 2019-03-25
Payer: COMMERCIAL

## 2019-03-25 VITALS
SYSTOLIC BLOOD PRESSURE: 130 MMHG | RESPIRATION RATE: 16 BRPM | TEMPERATURE: 97.9 F | HEART RATE: 80 BPM | DIASTOLIC BLOOD PRESSURE: 88 MMHG

## 2019-03-25 DIAGNOSIS — R20.2 NUMBNESS AND TINGLING OF HAND: Primary | ICD-10-CM

## 2019-03-25 DIAGNOSIS — R20.0 NUMBNESS AND TINGLING OF HAND: Primary | ICD-10-CM

## 2019-03-25 PROCEDURE — G8428 CUR MEDS NOT DOCUMENT: HCPCS | Performed by: ORTHOPAEDIC SURGERY

## 2019-03-25 PROCEDURE — G8484 FLU IMMUNIZE NO ADMIN: HCPCS | Performed by: ORTHOPAEDIC SURGERY

## 2019-03-25 PROCEDURE — G8417 CALC BMI ABV UP PARAM F/U: HCPCS | Performed by: ORTHOPAEDIC SURGERY

## 2019-03-25 PROCEDURE — 99213 OFFICE O/P EST LOW 20 MIN: CPT | Performed by: ORTHOPAEDIC SURGERY

## 2019-03-25 PROCEDURE — 1036F TOBACCO NON-USER: CPT | Performed by: ORTHOPAEDIC SURGERY

## 2019-04-04 ENCOUNTER — HOSPITAL ENCOUNTER (OUTPATIENT)
Dept: NEUROLOGY | Age: 34
Discharge: HOME | End: 2019-04-04
Payer: COMMERCIAL

## 2019-04-04 VITALS — WEIGHT: 240 LBS | HEIGHT: 67 IN | BODY MASS INDEX: 37.67 KG/M2

## 2019-04-04 DIAGNOSIS — R20.0 NUMBNESS AND TINGLING OF HAND: ICD-10-CM

## 2019-04-04 DIAGNOSIS — R20.2 NUMBNESS AND TINGLING OF HAND: ICD-10-CM

## 2019-04-04 NOTE — PROGRESS NOTES
Outpatient EMG/NCV was not completed. Patient was unable to tolerate nerve conduction stimulations and refused to continue with testing.      Pattie Angeles

## 2019-06-27 ENCOUNTER — INITIAL CONSULT (OUTPATIENT)
Dept: NEUROSURGERY | Age: 34
End: 2019-06-27
Payer: COMMERCIAL

## 2019-06-27 VITALS
HEIGHT: 66 IN | RESPIRATION RATE: 14 BRPM | HEART RATE: 91 BPM | OXYGEN SATURATION: 99 % | WEIGHT: 242 LBS | BODY MASS INDEX: 38.89 KG/M2

## 2019-06-27 DIAGNOSIS — M51.36 LUMBAR DEGENERATIVE DISC DISEASE: Primary | ICD-10-CM

## 2019-06-27 DIAGNOSIS — M54.31 SCIATICA OF RIGHT SIDE: Primary | ICD-10-CM

## 2019-06-27 PROCEDURE — G8417 CALC BMI ABV UP PARAM F/U: HCPCS | Performed by: PHYSICIAN ASSISTANT

## 2019-06-27 PROCEDURE — G8427 DOCREV CUR MEDS BY ELIG CLIN: HCPCS | Performed by: PHYSICIAN ASSISTANT

## 2019-06-27 PROCEDURE — 99203 OFFICE O/P NEW LOW 30 MIN: CPT | Performed by: PHYSICIAN ASSISTANT

## 2019-06-27 PROCEDURE — 1036F TOBACCO NON-USER: CPT | Performed by: PHYSICIAN ASSISTANT

## 2019-06-27 SDOH — HEALTH STABILITY: MENTAL HEALTH: HOW OFTEN DO YOU HAVE A DRINK CONTAINING ALCOHOL?: NEVER

## 2019-06-27 SDOH — HEALTH STABILITY: MENTAL HEALTH: HOW MANY STANDARD DRINKS CONTAINING ALCOHOL DO YOU HAVE ON A TYPICAL DAY?: 1 OR 2

## 2019-06-27 ASSESSMENT — ENCOUNTER SYMPTOMS
EYES NEGATIVE: 1
GASTROINTESTINAL NEGATIVE: 1
RESPIRATORY NEGATIVE: 1
ALLERGIC/IMMUNOLOGIC NEGATIVE: 1
BACK PAIN: 1

## 2019-06-27 NOTE — COMMUNICATION BODY
Assessment:     30year old female with low and mid back pain. Thoracic MRI without disc herniation, stenosis or acute finding. Plan: We will begin PT and order lumbar x-rays. She may continue with NSAIDS if beneficial.  She is not currently a surgical candidate.

## 2019-06-27 NOTE — PROGRESS NOTES
continue with NSAIDS if beneficial.  She is not currently a surgical candidate.         TREVON Ayala

## 2019-06-27 NOTE — LETTER
[facility] Neurosurgery  [facility]  [place] [address]  Phone: [phone]  Fax: [phone]    Vielka Mayfield MD        June 27, 2019       Patient: Sofy Varma   MR Number: [id number]   YOB: 1985   Date of Visit: 6/27/2019       Dear Dr. Sal Wilkerson: Thank you for the request for consultation for [facility] to me for the evaluation of back pain. Below are the relevant portions of my assessment and plan of care. Assessment:     30year old female with low and mid back pain. Thoracic MRI without disc herniation, stenosis or acute finding. Plan: We will begin PT and order lumbar x-rays. She may continue with NSAIDS if beneficial.  She is not currently a surgical candidate. If you have questions, please do not hesitate to call me. I look forward to following [facility] along with you.     Sincerely,        TREVON Dietz    CC providers:  Liz Bolanos MD  [address] [address]  VIA Mail

## 2019-08-02 ENCOUNTER — HOSPITAL ENCOUNTER (EMERGENCY)
Age: 34
Discharge: HOME | End: 2019-08-02
Payer: COMMERCIAL

## 2019-08-02 VITALS
HEIGHT: 67 IN | HEART RATE: 84 BPM | DIASTOLIC BLOOD PRESSURE: 63 MMHG | RESPIRATION RATE: 16 BRPM | SYSTOLIC BLOOD PRESSURE: 138 MMHG | WEIGHT: 240 LBS | OXYGEN SATURATION: 100 % | TEMPERATURE: 98.4 F | BODY MASS INDEX: 37.67 KG/M2

## 2019-08-02 DIAGNOSIS — R19.7 DIARRHEA, UNSPECIFIED TYPE: Primary | ICD-10-CM

## 2019-08-02 DIAGNOSIS — R51.9 NONINTRACTABLE HEADACHE, UNSPECIFIED CHRONICITY PATTERN, UNSPECIFIED HEADACHE TYPE: ICD-10-CM

## 2019-08-02 DIAGNOSIS — R53.83 FATIGUE, UNSPECIFIED TYPE: ICD-10-CM

## 2019-08-02 LAB
ALBUMIN SERPL-MCNC: 4.3 G/DL (ref 3.5–5.2)
ALP BLD-CCNC: 60 U/L (ref 35–104)
ALT SERPL-CCNC: 30 U/L (ref 0–32)
ANION GAP SERPL CALCULATED.3IONS-SCNC: 13 MMOL/L (ref 7–16)
AST SERPL-CCNC: 28 U/L (ref 0–31)
BACTERIA: ABNORMAL /HPF
BASOPHILS ABSOLUTE: 0.05 E9/L (ref 0–0.2)
BASOPHILS RELATIVE PERCENT: 0.6 % (ref 0–2)
BILIRUB SERPL-MCNC: 0.5 MG/DL (ref 0–1.2)
BILIRUBIN DIRECT: <0.2 MG/DL (ref 0–0.3)
BILIRUBIN URINE: NEGATIVE
BILIRUBIN, INDIRECT: NORMAL MG/DL (ref 0–1)
BLOOD, URINE: NEGATIVE
BUN BLDV-MCNC: 6 MG/DL (ref 6–20)
CALCIUM SERPL-MCNC: 9.4 MG/DL (ref 8.6–10.2)
CHLORIDE BLD-SCNC: 101 MMOL/L (ref 98–107)
CLARITY: CLEAR
CO2: 24 MMOL/L (ref 22–29)
COLOR: YELLOW
CREAT SERPL-MCNC: 0.9 MG/DL (ref 0.5–1)
EOSINOPHILS ABSOLUTE: 0.06 E9/L (ref 0.05–0.5)
EOSINOPHILS RELATIVE PERCENT: 0.8 % (ref 0–6)
GFR AFRICAN AMERICAN: >60
GFR NON-AFRICAN AMERICAN: >60 ML/MIN/1.73
GLUCOSE BLD-MCNC: 92 MG/DL (ref 74–99)
GLUCOSE URINE: NEGATIVE MG/DL
HCG, URINE, POC: NEGATIVE
HCT VFR BLD CALC: 33.8 % (ref 34–48)
HEMOGLOBIN: 10.7 G/DL (ref 11.5–15.5)
IMMATURE GRANULOCYTES #: 0.02 E9/L
IMMATURE GRANULOCYTES %: 0.3 % (ref 0–5)
KETONES, URINE: NEGATIVE MG/DL
LEUKOCYTE ESTERASE, URINE: ABNORMAL
LIPASE: 17 U/L (ref 13–60)
LYMPHOCYTES ABSOLUTE: 2.2 E9/L (ref 1.5–4)
LYMPHOCYTES RELATIVE PERCENT: 28.4 % (ref 20–42)
Lab: NORMAL
MCH RBC QN AUTO: 23.3 PG (ref 26–35)
MCHC RBC AUTO-ENTMCNC: 31.7 % (ref 32–34.5)
MCV RBC AUTO: 73.5 FL (ref 80–99.9)
MONOCYTES ABSOLUTE: 0.68 E9/L (ref 0.1–0.95)
MONOCYTES RELATIVE PERCENT: 8.8 % (ref 2–12)
NEGATIVE QC PASS/FAIL: NORMAL
NEUTROPHILS ABSOLUTE: 4.73 E9/L (ref 1.8–7.3)
NEUTROPHILS RELATIVE PERCENT: 61.1 % (ref 43–80)
NITRITE, URINE: NEGATIVE
PDW BLD-RTO: 18.4 FL (ref 11.5–15)
PH UA: 6 (ref 5–9)
PLATELET # BLD: 398 E9/L (ref 130–450)
PMV BLD AUTO: 8.9 FL (ref 7–12)
POSITIVE QC PASS/FAIL: NORMAL
POTASSIUM SERPL-SCNC: 4 MMOL/L (ref 3.5–5)
PROTEIN UA: NEGATIVE MG/DL
RBC # BLD: 4.6 E12/L (ref 3.5–5.5)
RBC UA: ABNORMAL /HPF (ref 0–2)
SODIUM BLD-SCNC: 138 MMOL/L (ref 132–146)
SPECIFIC GRAVITY UA: 1.01 (ref 1–1.03)
TOTAL PROTEIN: 7.7 G/DL (ref 6.4–8.3)
UROBILINOGEN, URINE: 0.2 E.U./DL
WBC # BLD: 7.7 E9/L (ref 4.5–11.5)
WBC UA: ABNORMAL /HPF (ref 0–5)

## 2019-08-02 PROCEDURE — 2580000003 HC RX 258: Performed by: PHYSICIAN ASSISTANT

## 2019-08-02 PROCEDURE — 81001 URINALYSIS AUTO W/SCOPE: CPT

## 2019-08-02 PROCEDURE — 80076 HEPATIC FUNCTION PANEL: CPT

## 2019-08-02 PROCEDURE — 96375 TX/PRO/DX INJ NEW DRUG ADDON: CPT

## 2019-08-02 PROCEDURE — 80048 BASIC METABOLIC PNL TOTAL CA: CPT

## 2019-08-02 PROCEDURE — 99284 EMERGENCY DEPT VISIT MOD MDM: CPT

## 2019-08-02 PROCEDURE — 83690 ASSAY OF LIPASE: CPT

## 2019-08-02 PROCEDURE — 96374 THER/PROPH/DIAG INJ IV PUSH: CPT

## 2019-08-02 PROCEDURE — 85025 COMPLETE CBC W/AUTO DIFF WBC: CPT

## 2019-08-02 PROCEDURE — 6360000002 HC RX W HCPCS: Performed by: PHYSICIAN ASSISTANT

## 2019-08-02 RX ORDER — DIPHENHYDRAMINE HYDROCHLORIDE 50 MG/ML
25 INJECTION INTRAMUSCULAR; INTRAVENOUS ONCE
Status: COMPLETED | OUTPATIENT
Start: 2019-08-02 | End: 2019-08-02

## 2019-08-02 RX ORDER — KETOROLAC TROMETHAMINE 10 MG/1
10 TABLET, FILM COATED ORAL EVERY 8 HOURS PRN
Qty: 20 TABLET | Refills: 0 | Status: SHIPPED | OUTPATIENT
Start: 2019-08-02 | End: 2019-10-19

## 2019-08-02 RX ORDER — ONDANSETRON 2 MG/ML
4 INJECTION INTRAMUSCULAR; INTRAVENOUS ONCE
Status: COMPLETED | OUTPATIENT
Start: 2019-08-02 | End: 2019-08-02

## 2019-08-02 RX ORDER — HYDROXYZINE PAMOATE 50 MG/1
50 CAPSULE ORAL 3 TIMES DAILY PRN
Qty: 30 CAPSULE | Refills: 0 | Status: SHIPPED | OUTPATIENT
Start: 2019-08-02 | End: 2019-08-12

## 2019-08-02 RX ORDER — 0.9 % SODIUM CHLORIDE 0.9 %
1000 INTRAVENOUS SOLUTION INTRAVENOUS ONCE
Status: COMPLETED | OUTPATIENT
Start: 2019-08-02 | End: 2019-08-02

## 2019-08-02 RX ORDER — KETOROLAC TROMETHAMINE 30 MG/ML
30 INJECTION, SOLUTION INTRAMUSCULAR; INTRAVENOUS ONCE
Status: COMPLETED | OUTPATIENT
Start: 2019-08-02 | End: 2019-08-02

## 2019-08-02 RX ADMIN — ONDANSETRON 4 MG: 2 INJECTION INTRAMUSCULAR; INTRAVENOUS at 19:22

## 2019-08-02 RX ADMIN — KETOROLAC TROMETHAMINE 30 MG: 30 INJECTION, SOLUTION INTRAMUSCULAR at 19:21

## 2019-08-02 RX ADMIN — SODIUM CHLORIDE 1000 ML: 9 INJECTION, SOLUTION INTRAVENOUS at 19:22

## 2019-08-02 RX ADMIN — DIPHENHYDRAMINE HYDROCHLORIDE 25 MG: 50 INJECTION, SOLUTION INTRAMUSCULAR; INTRAVENOUS at 19:21

## 2019-08-02 ASSESSMENT — PAIN SCALES - GENERAL: PAINLEVEL_OUTOF10: 8

## 2019-09-06 ENCOUNTER — HOSPITAL ENCOUNTER (OUTPATIENT)
Dept: GENERAL RADIOLOGY | Age: 34
Discharge: HOME | End: 2019-09-08
Payer: COMMERCIAL

## 2019-09-06 ENCOUNTER — HOSPITAL ENCOUNTER (OUTPATIENT)
Age: 34
Discharge: HOME | End: 2019-09-08
Payer: COMMERCIAL

## 2019-09-06 DIAGNOSIS — R52 PAIN: ICD-10-CM

## 2019-09-06 PROCEDURE — 71100 X-RAY EXAM RIBS UNI 2 VIEWS: CPT

## 2019-09-06 PROCEDURE — 71046 X-RAY EXAM CHEST 2 VIEWS: CPT

## 2019-10-01 ENCOUNTER — APPOINTMENT (OUTPATIENT)
Dept: CT IMAGING | Age: 34
End: 2019-10-01
Payer: COMMERCIAL

## 2019-10-01 ENCOUNTER — HOSPITAL ENCOUNTER (EMERGENCY)
Age: 34
Discharge: HOME | End: 2019-10-01
Attending: EMERGENCY MEDICINE
Payer: COMMERCIAL

## 2019-10-01 VITALS
WEIGHT: 240 LBS | HEART RATE: 84 BPM | DIASTOLIC BLOOD PRESSURE: 85 MMHG | RESPIRATION RATE: 16 BRPM | HEIGHT: 66 IN | OXYGEN SATURATION: 99 % | TEMPERATURE: 98.2 F | SYSTOLIC BLOOD PRESSURE: 110 MMHG | BODY MASS INDEX: 38.57 KG/M2

## 2019-10-01 DIAGNOSIS — R51.9 NONINTRACTABLE EPISODIC HEADACHE, UNSPECIFIED HEADACHE TYPE: Primary | ICD-10-CM

## 2019-10-01 DIAGNOSIS — F41.1 ANXIETY STATE: ICD-10-CM

## 2019-10-01 PROCEDURE — 70450 CT HEAD/BRAIN W/O DYE: CPT

## 2019-10-01 PROCEDURE — 96374 THER/PROPH/DIAG INJ IV PUSH: CPT

## 2019-10-01 PROCEDURE — 6360000002 HC RX W HCPCS: Performed by: EMERGENCY MEDICINE

## 2019-10-01 PROCEDURE — 99284 EMERGENCY DEPT VISIT MOD MDM: CPT

## 2019-10-01 PROCEDURE — 96375 TX/PRO/DX INJ NEW DRUG ADDON: CPT

## 2019-10-01 RX ORDER — PROCHLORPERAZINE EDISYLATE 5 MG/ML
10 INJECTION INTRAMUSCULAR; INTRAVENOUS ONCE
Status: COMPLETED | OUTPATIENT
Start: 2019-10-01 | End: 2019-10-01

## 2019-10-01 RX ORDER — HYDROXYZINE PAMOATE 50 MG/1
50 CAPSULE ORAL 3 TIMES DAILY PRN
Qty: 21 CAPSULE | Refills: 0 | Status: SHIPPED | OUTPATIENT
Start: 2019-10-01 | End: 2019-10-08

## 2019-10-01 RX ORDER — DIPHENHYDRAMINE HYDROCHLORIDE 50 MG/ML
50 INJECTION INTRAMUSCULAR; INTRAVENOUS ONCE
Status: COMPLETED | OUTPATIENT
Start: 2019-10-01 | End: 2019-10-01

## 2019-10-01 RX ORDER — KETOROLAC TROMETHAMINE 10 MG/1
10 TABLET, FILM COATED ORAL EVERY 8 HOURS PRN
Qty: 15 TABLET | Refills: 0 | Status: SHIPPED | OUTPATIENT
Start: 2019-10-01 | End: 2019-10-19

## 2019-10-01 RX ORDER — KETOROLAC TROMETHAMINE 30 MG/ML
15 INJECTION, SOLUTION INTRAMUSCULAR; INTRAVENOUS ONCE
Status: COMPLETED | OUTPATIENT
Start: 2019-10-01 | End: 2019-10-01

## 2019-10-01 RX ORDER — LORAZEPAM 1 MG/1
1 TABLET ORAL ONCE
Status: DISCONTINUED | OUTPATIENT
Start: 2019-10-01 | End: 2019-10-02 | Stop reason: HOSPADM

## 2019-10-01 RX ADMIN — KETOROLAC TROMETHAMINE 15 MG: 30 INJECTION, SOLUTION INTRAMUSCULAR; INTRAVENOUS at 22:48

## 2019-10-01 RX ADMIN — PROCHLORPERAZINE EDISYLATE 10 MG: 5 INJECTION INTRAMUSCULAR; INTRAVENOUS at 22:48

## 2019-10-01 RX ADMIN — DIPHENHYDRAMINE HYDROCHLORIDE 50 MG: 50 INJECTION, SOLUTION INTRAMUSCULAR; INTRAVENOUS at 22:48

## 2019-10-01 ASSESSMENT — PAIN SCALES - GENERAL
PAINLEVEL_OUTOF10: 8
PAINLEVEL_OUTOF10: 6

## 2019-10-07 ENCOUNTER — TELEPHONE (OUTPATIENT)
Dept: NEUROLOGY | Age: 34
End: 2019-10-07

## 2019-10-19 ENCOUNTER — APPOINTMENT (OUTPATIENT)
Dept: CT IMAGING | Age: 34
End: 2019-10-19
Payer: COMMERCIAL

## 2019-10-19 ENCOUNTER — HOSPITAL ENCOUNTER (EMERGENCY)
Age: 34
Discharge: HOME | End: 2019-10-19
Attending: EMERGENCY MEDICINE
Payer: COMMERCIAL

## 2019-10-19 VITALS
SYSTOLIC BLOOD PRESSURE: 132 MMHG | RESPIRATION RATE: 16 BRPM | TEMPERATURE: 98.2 F | HEART RATE: 89 BPM | OXYGEN SATURATION: 96 % | DIASTOLIC BLOOD PRESSURE: 69 MMHG

## 2019-10-19 DIAGNOSIS — R00.2 PALPITATIONS: Primary | ICD-10-CM

## 2019-10-19 LAB
ALBUMIN SERPL-MCNC: 4.3 G/DL (ref 3.5–5.2)
ALP BLD-CCNC: 57 U/L (ref 35–104)
ALT SERPL-CCNC: 28 U/L (ref 0–32)
ANION GAP SERPL CALCULATED.3IONS-SCNC: 13 MMOL/L (ref 7–16)
APTT: 25.6 SEC (ref 24.5–35.1)
AST SERPL-CCNC: 24 U/L (ref 0–31)
BASOPHILS ABSOLUTE: 0.04 E9/L (ref 0–0.2)
BASOPHILS RELATIVE PERCENT: 0.5 % (ref 0–2)
BILIRUB SERPL-MCNC: 0.4 MG/DL (ref 0–1.2)
BUN BLDV-MCNC: 7 MG/DL (ref 6–20)
CALCIUM SERPL-MCNC: 9.5 MG/DL (ref 8.6–10.2)
CHLORIDE BLD-SCNC: 104 MMOL/L (ref 98–107)
CO2: 23 MMOL/L (ref 22–29)
CREAT SERPL-MCNC: 0.9 MG/DL (ref 0.5–1)
D DIMER: 405 NG/ML DDU
EOSINOPHILS ABSOLUTE: 0.02 E9/L (ref 0.05–0.5)
EOSINOPHILS RELATIVE PERCENT: 0.2 % (ref 0–6)
GFR AFRICAN AMERICAN: >60
GFR NON-AFRICAN AMERICAN: >60 ML/MIN/1.73
GLUCOSE BLD-MCNC: 108 MG/DL (ref 74–99)
HCG, URINE, POC: NEGATIVE
HCT VFR BLD CALC: 33.1 % (ref 34–48)
HEMOGLOBIN: 10.4 G/DL (ref 11.5–15.5)
IMMATURE GRANULOCYTES #: 0.02 E9/L
IMMATURE GRANULOCYTES %: 0.2 % (ref 0–5)
INR BLD: 1
LYMPHOCYTES ABSOLUTE: 1.64 E9/L (ref 1.5–4)
LYMPHOCYTES RELATIVE PERCENT: 20.3 % (ref 20–42)
Lab: NORMAL
MCH RBC QN AUTO: 22.4 PG (ref 26–35)
MCHC RBC AUTO-ENTMCNC: 31.4 % (ref 32–34.5)
MCV RBC AUTO: 71.2 FL (ref 80–99.9)
MONOCYTES ABSOLUTE: 0.57 E9/L (ref 0.1–0.95)
MONOCYTES RELATIVE PERCENT: 7.1 % (ref 2–12)
NEGATIVE QC PASS/FAIL: NORMAL
NEUTROPHILS ABSOLUTE: 5.78 E9/L (ref 1.8–7.3)
NEUTROPHILS RELATIVE PERCENT: 71.7 % (ref 43–80)
PDW BLD-RTO: 17.5 FL (ref 11.5–15)
PLATELET # BLD: 423 E9/L (ref 130–450)
PMV BLD AUTO: 9 FL (ref 7–12)
POSITIVE QC PASS/FAIL: NORMAL
POTASSIUM REFLEX MAGNESIUM: 3.9 MMOL/L (ref 3.5–5)
PROTHROMBIN TIME: 11.6 SEC (ref 9.3–12.4)
RBC # BLD: 4.65 E12/L (ref 3.5–5.5)
SODIUM BLD-SCNC: 140 MMOL/L (ref 132–146)
TOTAL PROTEIN: 7.8 G/DL (ref 6.4–8.3)
TROPONIN: <0.01 NG/ML (ref 0–0.03)
TROPONIN: <0.01 NG/ML (ref 0–0.03)
TSH SERPL DL<=0.05 MIU/L-ACNC: 1.15 UIU/ML (ref 0.27–4.2)
WBC # BLD: 8.1 E9/L (ref 4.5–11.5)

## 2019-10-19 PROCEDURE — 93005 ELECTROCARDIOGRAM TRACING: CPT | Performed by: EMERGENCY MEDICINE

## 2019-10-19 PROCEDURE — 80053 COMPREHEN METABOLIC PANEL: CPT

## 2019-10-19 PROCEDURE — 71275 CT ANGIOGRAPHY CHEST: CPT

## 2019-10-19 PROCEDURE — 84484 ASSAY OF TROPONIN QUANT: CPT

## 2019-10-19 PROCEDURE — 6360000004 HC RX CONTRAST MEDICATION: Performed by: RADIOLOGY

## 2019-10-19 PROCEDURE — 99285 EMERGENCY DEPT VISIT HI MDM: CPT

## 2019-10-19 PROCEDURE — 84443 ASSAY THYROID STIM HORMONE: CPT

## 2019-10-19 PROCEDURE — 85025 COMPLETE CBC W/AUTO DIFF WBC: CPT

## 2019-10-19 PROCEDURE — 2580000003 HC RX 258: Performed by: EMERGENCY MEDICINE

## 2019-10-19 PROCEDURE — 85610 PROTHROMBIN TIME: CPT

## 2019-10-19 PROCEDURE — 85378 FIBRIN DEGRADE SEMIQUANT: CPT

## 2019-10-19 PROCEDURE — 85730 THROMBOPLASTIN TIME PARTIAL: CPT

## 2019-10-19 RX ORDER — 0.9 % SODIUM CHLORIDE 0.9 %
1000 INTRAVENOUS SOLUTION INTRAVENOUS ONCE
Status: COMPLETED | OUTPATIENT
Start: 2019-10-19 | End: 2019-10-19

## 2019-10-19 RX ORDER — LORAZEPAM 2 MG/ML
1 INJECTION INTRAMUSCULAR ONCE
Status: DISCONTINUED | OUTPATIENT
Start: 2019-10-19 | End: 2019-10-19 | Stop reason: HOSPADM

## 2019-10-19 RX ADMIN — SODIUM CHLORIDE 1000 ML: 9 INJECTION, SOLUTION INTRAVENOUS at 17:21

## 2019-10-19 RX ADMIN — IOPAMIDOL 80 ML: 755 INJECTION, SOLUTION INTRAVENOUS at 19:35

## 2019-10-19 ASSESSMENT — ENCOUNTER SYMPTOMS
HEMOPTYSIS: 0
VOMITING: 0
COUGH: 0
EYES NEGATIVE: 1
ALLERGIC/IMMUNOLOGIC NEGATIVE: 1
BACK PAIN: 0
ORTHOPNEA: 0
NAUSEA: 0
SHORTNESS OF BREATH: 0

## 2019-10-22 LAB
EKG ATRIAL RATE: 89 BPM
EKG P AXIS: 43 DEGREES
EKG P-R INTERVAL: 158 MS
EKG Q-T INTERVAL: 376 MS
EKG QRS DURATION: 78 MS
EKG QTC CALCULATION (BAZETT): 457 MS
EKG R AXIS: 52 DEGREES
EKG T AXIS: 46 DEGREES
EKG VENTRICULAR RATE: 89 BPM

## 2019-10-22 PROCEDURE — 93010 ELECTROCARDIOGRAM REPORT: CPT | Performed by: INTERNAL MEDICINE

## 2019-12-18 ENCOUNTER — HOSPITAL ENCOUNTER (EMERGENCY)
Age: 34
Discharge: HOME | End: 2019-12-18
Attending: EMERGENCY MEDICINE
Payer: COMMERCIAL

## 2019-12-18 ENCOUNTER — APPOINTMENT (OUTPATIENT)
Dept: GENERAL RADIOLOGY | Age: 34
End: 2019-12-18
Payer: COMMERCIAL

## 2019-12-18 ENCOUNTER — APPOINTMENT (OUTPATIENT)
Dept: CT IMAGING | Age: 34
End: 2019-12-18
Payer: COMMERCIAL

## 2019-12-18 VITALS
DIASTOLIC BLOOD PRESSURE: 70 MMHG | SYSTOLIC BLOOD PRESSURE: 110 MMHG | HEIGHT: 67 IN | RESPIRATION RATE: 20 BRPM | OXYGEN SATURATION: 100 % | HEART RATE: 88 BPM | WEIGHT: 220 LBS | BODY MASS INDEX: 34.53 KG/M2 | TEMPERATURE: 98.4 F

## 2019-12-18 DIAGNOSIS — R51.9 FACIAL DISCOMFORT: ICD-10-CM

## 2019-12-18 DIAGNOSIS — R07.9 CHEST PAIN, UNSPECIFIED TYPE: Primary | ICD-10-CM

## 2019-12-18 LAB
ANION GAP SERPL CALCULATED.3IONS-SCNC: 9 MMOL/L (ref 7–16)
BACTERIA: ABNORMAL /HPF
BASOPHILS ABSOLUTE: 0.03 E9/L (ref 0–0.2)
BASOPHILS RELATIVE PERCENT: 0.8 % (ref 0–2)
BILIRUBIN URINE: NEGATIVE
BLOOD, URINE: ABNORMAL
BUN BLDV-MCNC: 5 MG/DL (ref 6–20)
CALCIUM SERPL-MCNC: 9.3 MG/DL (ref 8.6–10.2)
CHLORIDE BLD-SCNC: 102 MMOL/L (ref 98–107)
CLARITY: ABNORMAL
CO2: 28 MMOL/L (ref 22–29)
COLOR: ABNORMAL
CREAT SERPL-MCNC: 0.9 MG/DL (ref 0.5–1)
EKG ATRIAL RATE: 88 BPM
EKG P AXIS: 22 DEGREES
EKG P-R INTERVAL: 164 MS
EKG Q-T INTERVAL: 388 MS
EKG QRS DURATION: 78 MS
EKG QTC CALCULATION (BAZETT): 469 MS
EKG R AXIS: 70 DEGREES
EKG T AXIS: 41 DEGREES
EKG VENTRICULAR RATE: 88 BPM
EOSINOPHILS ABSOLUTE: 0.08 E9/L (ref 0.05–0.5)
EOSINOPHILS RELATIVE PERCENT: 2.1 % (ref 0–6)
GFR AFRICAN AMERICAN: >60
GFR NON-AFRICAN AMERICAN: >60 ML/MIN/1.73
GLUCOSE BLD-MCNC: 103 MG/DL (ref 74–99)
GLUCOSE URINE: NEGATIVE MG/DL
HCG, URINE, POC: NEGATIVE
HCT VFR BLD CALC: 32.9 % (ref 34–48)
HEMOGLOBIN: 10.3 G/DL (ref 11.5–15.5)
IMMATURE GRANULOCYTES #: 0.01 E9/L
IMMATURE GRANULOCYTES %: 0.3 % (ref 0–5)
KETONES, URINE: NEGATIVE MG/DL
LEUKOCYTE ESTERASE, URINE: NEGATIVE
LYMPHOCYTES ABSOLUTE: 1.11 E9/L (ref 1.5–4)
LYMPHOCYTES RELATIVE PERCENT: 28.5 % (ref 20–42)
Lab: NORMAL
MCH RBC QN AUTO: 21.8 PG (ref 26–35)
MCHC RBC AUTO-ENTMCNC: 31.3 % (ref 32–34.5)
MCV RBC AUTO: 69.7 FL (ref 80–99.9)
MONOCYTES ABSOLUTE: 0.38 E9/L (ref 0.1–0.95)
MONOCYTES RELATIVE PERCENT: 9.7 % (ref 2–12)
MUCUS: PRESENT
NEGATIVE QC PASS/FAIL: NORMAL
NEUTROPHILS ABSOLUTE: 2.29 E9/L (ref 1.8–7.3)
NEUTROPHILS RELATIVE PERCENT: 58.6 % (ref 43–80)
NITRITE, URINE: NEGATIVE
PDW BLD-RTO: 17.6 FL (ref 11.5–15)
PH UA: 7.5 (ref 5–9)
PLATELET # BLD: 409 E9/L (ref 130–450)
PMV BLD AUTO: 9.7 FL (ref 7–12)
POSITIVE QC PASS/FAIL: NORMAL
POTASSIUM SERPL-SCNC: 3.6 MMOL/L (ref 3.5–5)
PROTEIN UA: 30 MG/DL
RBC # BLD: 4.72 E12/L (ref 3.5–5.5)
RBC UA: ABNORMAL /HPF (ref 0–2)
SODIUM BLD-SCNC: 139 MMOL/L (ref 132–146)
SPECIFIC GRAVITY UA: 1.01 (ref 1–1.03)
TROPONIN: <0.01 NG/ML (ref 0–0.03)
UROBILINOGEN, URINE: 1 E.U./DL
WBC # BLD: 3.9 E9/L (ref 4.5–11.5)
WBC UA: ABNORMAL /HPF (ref 0–5)

## 2019-12-18 PROCEDURE — 93010 ELECTROCARDIOGRAM REPORT: CPT | Performed by: INTERNAL MEDICINE

## 2019-12-18 PROCEDURE — 80048 BASIC METABOLIC PNL TOTAL CA: CPT

## 2019-12-18 PROCEDURE — 99285 EMERGENCY DEPT VISIT HI MDM: CPT

## 2019-12-18 PROCEDURE — 81001 URINALYSIS AUTO W/SCOPE: CPT

## 2019-12-18 PROCEDURE — 93005 ELECTROCARDIOGRAM TRACING: CPT | Performed by: STUDENT IN AN ORGANIZED HEALTH CARE EDUCATION/TRAINING PROGRAM

## 2019-12-18 PROCEDURE — 84484 ASSAY OF TROPONIN QUANT: CPT

## 2019-12-18 PROCEDURE — 70450 CT HEAD/BRAIN W/O DYE: CPT

## 2019-12-18 PROCEDURE — 71046 X-RAY EXAM CHEST 2 VIEWS: CPT

## 2019-12-18 PROCEDURE — 85025 COMPLETE CBC W/AUTO DIFF WBC: CPT

## 2019-12-18 ASSESSMENT — ENCOUNTER SYMPTOMS
CONSTIPATION: 0
COUGH: 0
SHORTNESS OF BREATH: 0
NAUSEA: 0
WHEEZING: 0
DIARRHEA: 0
VOMITING: 0
ABDOMINAL PAIN: 0
COLOR CHANGE: 0
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
## 2020-10-08 ENCOUNTER — OFFICE VISIT (OUTPATIENT)
Dept: NON INVASIVE DIAGNOSTICS | Age: 35
End: 2020-10-08
Payer: COMMERCIAL

## 2020-10-08 VITALS
HEART RATE: 81 BPM | RESPIRATION RATE: 16 BRPM | TEMPERATURE: 98 F | HEIGHT: 66 IN | SYSTOLIC BLOOD PRESSURE: 116 MMHG | WEIGHT: 193 LBS | BODY MASS INDEX: 31.02 KG/M2 | DIASTOLIC BLOOD PRESSURE: 70 MMHG

## 2020-10-08 PROCEDURE — G8417 CALC BMI ABV UP PARAM F/U: HCPCS | Performed by: INTERNAL MEDICINE

## 2020-10-08 PROCEDURE — 99205 OFFICE O/P NEW HI 60 MIN: CPT | Performed by: INTERNAL MEDICINE

## 2020-10-08 PROCEDURE — 1036F TOBACCO NON-USER: CPT | Performed by: INTERNAL MEDICINE

## 2020-10-08 PROCEDURE — 93000 ELECTROCARDIOGRAM COMPLETE: CPT | Performed by: INTERNAL MEDICINE

## 2020-10-08 PROCEDURE — G8427 DOCREV CUR MEDS BY ELIG CLIN: HCPCS | Performed by: INTERNAL MEDICINE

## 2020-10-08 PROCEDURE — G8484 FLU IMMUNIZE NO ADMIN: HCPCS | Performed by: INTERNAL MEDICINE

## 2020-10-13 ENCOUNTER — APPOINTMENT (OUTPATIENT)
Dept: CT IMAGING | Age: 35
End: 2020-10-13
Payer: COMMERCIAL

## 2020-10-13 ENCOUNTER — APPOINTMENT (OUTPATIENT)
Dept: GENERAL RADIOLOGY | Age: 35
End: 2020-10-13
Payer: COMMERCIAL

## 2020-10-13 ENCOUNTER — HOSPITAL ENCOUNTER (EMERGENCY)
Age: 35
Discharge: HOME | End: 2020-10-13
Payer: COMMERCIAL

## 2020-10-13 VITALS
HEIGHT: 67 IN | BODY MASS INDEX: 30.29 KG/M2 | SYSTOLIC BLOOD PRESSURE: 114 MMHG | HEART RATE: 82 BPM | TEMPERATURE: 97.3 F | WEIGHT: 193 LBS | DIASTOLIC BLOOD PRESSURE: 69 MMHG | RESPIRATION RATE: 16 BRPM | OXYGEN SATURATION: 99 %

## 2020-10-13 LAB
ALBUMIN SERPL-MCNC: 4 G/DL (ref 3.5–5.2)
ALP BLD-CCNC: 45 U/L (ref 35–104)
ALT SERPL-CCNC: 7 U/L (ref 0–32)
ANION GAP SERPL CALCULATED.3IONS-SCNC: 10 MMOL/L (ref 7–16)
APTT: 32.2 SEC (ref 24.5–35.1)
AST SERPL-CCNC: 10 U/L (ref 0–31)
BASOPHILS ABSOLUTE: 0.05 E9/L (ref 0–0.2)
BASOPHILS RELATIVE PERCENT: 0.6 % (ref 0–2)
BILIRUB SERPL-MCNC: 0.4 MG/DL (ref 0–1.2)
BILIRUBIN URINE: NEGATIVE
BLOOD, URINE: NEGATIVE
BUN BLDV-MCNC: 7 MG/DL (ref 6–20)
CALCIUM SERPL-MCNC: 9.3 MG/DL (ref 8.6–10.2)
CHLORIDE BLD-SCNC: 106 MMOL/L (ref 98–107)
CLARITY: CLEAR
CO2: 24 MMOL/L (ref 22–29)
COLOR: YELLOW
CREAT SERPL-MCNC: 0.9 MG/DL (ref 0.5–1)
D DIMER: 404 NG/ML DDU
EOSINOPHILS ABSOLUTE: 0.28 E9/L (ref 0.05–0.5)
EOSINOPHILS RELATIVE PERCENT: 3.6 % (ref 0–6)
GFR AFRICAN AMERICAN: >60
GFR NON-AFRICAN AMERICAN: >60 ML/MIN/1.73
GLUCOSE BLD-MCNC: 83 MG/DL (ref 74–99)
GLUCOSE URINE: NEGATIVE MG/DL
HCG(URINE) PREGNANCY TEST: NEGATIVE
HCT VFR BLD CALC: 32.1 % (ref 34–48)
HEMOGLOBIN: 10 G/DL (ref 11.5–15.5)
IMMATURE GRANULOCYTES #: 0.02 E9/L
IMMATURE GRANULOCYTES %: 0.3 % (ref 0–5)
INR BLD: 1.1
KETONES, URINE: NEGATIVE MG/DL
LEUKOCYTE ESTERASE, URINE: NEGATIVE
LYMPHOCYTES ABSOLUTE: 1.63 E9/L (ref 1.5–4)
LYMPHOCYTES RELATIVE PERCENT: 21.1 % (ref 20–42)
MCH RBC QN AUTO: 20.6 PG (ref 26–35)
MCHC RBC AUTO-ENTMCNC: 31.2 % (ref 32–34.5)
MCV RBC AUTO: 66 FL (ref 80–99.9)
MONOCYTES ABSOLUTE: 0.69 E9/L (ref 0.1–0.95)
MONOCYTES RELATIVE PERCENT: 8.9 % (ref 2–12)
NEUTROPHILS ABSOLUTE: 5.04 E9/L (ref 1.8–7.3)
NEUTROPHILS RELATIVE PERCENT: 65.5 % (ref 43–80)
NITRITE, URINE: NEGATIVE
PDW BLD-RTO: 19 FL (ref 11.5–15)
PH UA: 7.5 (ref 5–9)
PLATELET # BLD: 416 E9/L (ref 130–450)
PMV BLD AUTO: 9.5 FL (ref 7–12)
POTASSIUM REFLEX MAGNESIUM: 4 MMOL/L (ref 3.5–5)
PROTEIN UA: NEGATIVE MG/DL
PROTHROMBIN TIME: 12.5 SEC (ref 9.3–12.4)
RBC # BLD: 4.86 E12/L (ref 3.5–5.5)
SODIUM BLD-SCNC: 140 MMOL/L (ref 132–146)
SPECIFIC GRAVITY UA: 1.01 (ref 1–1.03)
TOTAL PROTEIN: 7.7 G/DL (ref 6.4–8.3)
TROPONIN: <0.01 NG/ML (ref 0–0.03)
UROBILINOGEN, URINE: 1 E.U./DL
WBC # BLD: 7.7 E9/L (ref 4.5–11.5)

## 2020-10-13 PROCEDURE — 93005 ELECTROCARDIOGRAM TRACING: CPT | Performed by: PHYSICIAN ASSISTANT

## 2020-10-13 PROCEDURE — 81025 URINE PREGNANCY TEST: CPT

## 2020-10-13 PROCEDURE — 71275 CT ANGIOGRAPHY CHEST: CPT

## 2020-10-13 PROCEDURE — 81003 URINALYSIS AUTO W/O SCOPE: CPT

## 2020-10-13 PROCEDURE — 85378 FIBRIN DEGRADE SEMIQUANT: CPT

## 2020-10-13 PROCEDURE — 84484 ASSAY OF TROPONIN QUANT: CPT

## 2020-10-13 PROCEDURE — 85025 COMPLETE CBC W/AUTO DIFF WBC: CPT

## 2020-10-13 PROCEDURE — 80053 COMPREHEN METABOLIC PANEL: CPT

## 2020-10-13 PROCEDURE — 36415 COLL VENOUS BLD VENIPUNCTURE: CPT

## 2020-10-13 PROCEDURE — 85730 THROMBOPLASTIN TIME PARTIAL: CPT

## 2020-10-13 PROCEDURE — 2580000003 HC RX 258: Performed by: RADIOLOGY

## 2020-10-13 PROCEDURE — 99284 EMERGENCY DEPT VISIT MOD MDM: CPT

## 2020-10-13 PROCEDURE — 71046 X-RAY EXAM CHEST 2 VIEWS: CPT

## 2020-10-13 PROCEDURE — 6360000004 HC RX CONTRAST MEDICATION: Performed by: RADIOLOGY

## 2020-10-13 PROCEDURE — 85610 PROTHROMBIN TIME: CPT

## 2020-10-13 RX ORDER — SODIUM CHLORIDE 0.9 % (FLUSH) 0.9 %
10 SYRINGE (ML) INJECTION PRN
Status: DISCONTINUED | OUTPATIENT
Start: 2020-10-13 | End: 2020-10-13 | Stop reason: HOSPADM

## 2020-10-13 RX ADMIN — IOPAMIDOL 75 ML: 755 INJECTION, SOLUTION INTRAVENOUS at 18:32

## 2020-10-13 RX ADMIN — Medication 10 ML: at 18:32
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
## 2020-10-15 LAB
EKG ATRIAL RATE: 89 BPM
EKG P AXIS: 38 DEGREES
EKG P-R INTERVAL: 160 MS
EKG Q-T INTERVAL: 354 MS
EKG QRS DURATION: 78 MS
EKG QTC CALCULATION (BAZETT): 430 MS
EKG R AXIS: 44 DEGREES
EKG T AXIS: 52 DEGREES
EKG VENTRICULAR RATE: 89 BPM

## 2020-10-15 PROCEDURE — 93010 ELECTROCARDIOGRAM REPORT: CPT | Performed by: INTERNAL MEDICINE

## 2020-11-13 ENCOUNTER — TELEPHONE (OUTPATIENT)
Dept: NON INVASIVE DIAGNOSTICS | Age: 35
End: 2020-11-13

## 2020-11-30 ENCOUNTER — TELEPHONE (OUTPATIENT)
Dept: CARDIOLOGY | Age: 35
End: 2020-11-30

## 2020-12-01 ENCOUNTER — HOSPITAL ENCOUNTER (OUTPATIENT)
Dept: CARDIOLOGY | Age: 35
Discharge: HOME | End: 2020-12-01
Payer: COMMERCIAL

## 2020-12-01 LAB
LV EF: 60 %
LVEF MODALITY: NORMAL

## 2020-12-01 PROCEDURE — 93306 TTE W/DOPPLER COMPLETE: CPT

## 2020-12-02 ENCOUNTER — TELEPHONE (OUTPATIENT)
Dept: NON INVASIVE DIAGNOSTICS | Age: 35
End: 2020-12-02

## 2021-03-06 ENCOUNTER — HOSPITAL ENCOUNTER (EMERGENCY)
Age: 36
Discharge: HOME | End: 2021-03-06
Attending: EMERGENCY MEDICINE
Payer: COMMERCIAL

## 2021-03-06 PROCEDURE — 99284 EMERGENCY DEPT VISIT MOD MDM: CPT

## 2022-05-11 ENCOUNTER — APPOINTMENT (OUTPATIENT)
Dept: GENERAL RADIOLOGY | Age: 37
End: 2022-05-11
Payer: COMMERCIAL

## 2022-05-11 ENCOUNTER — HOSPITAL ENCOUNTER (EMERGENCY)
Age: 37
Discharge: HOME | End: 2022-05-12
Attending: STUDENT IN AN ORGANIZED HEALTH CARE EDUCATION/TRAINING PROGRAM
Payer: COMMERCIAL

## 2022-05-11 VITALS
RESPIRATION RATE: 16 BRPM | TEMPERATURE: 98.6 F | SYSTOLIC BLOOD PRESSURE: 116 MMHG | HEIGHT: 67 IN | WEIGHT: 163 LBS | OXYGEN SATURATION: 99 % | DIASTOLIC BLOOD PRESSURE: 56 MMHG | BODY MASS INDEX: 25.58 KG/M2 | HEART RATE: 79 BPM

## 2022-05-11 DIAGNOSIS — N30.01 ACUTE CYSTITIS WITH HEMATURIA: ICD-10-CM

## 2022-05-11 DIAGNOSIS — R11.0 NAUSEA: ICD-10-CM

## 2022-05-11 DIAGNOSIS — D64.9 ANEMIA, UNSPECIFIED TYPE: ICD-10-CM

## 2022-05-11 DIAGNOSIS — R00.2 PALPITATIONS: Primary | ICD-10-CM

## 2022-05-11 LAB
ALBUMIN SERPL-MCNC: 4 G/DL (ref 3.5–5.2)
ALP BLD-CCNC: 35 U/L (ref 35–104)
ALT SERPL-CCNC: 6 U/L (ref 0–32)
ANION GAP SERPL CALCULATED.3IONS-SCNC: 11 MMOL/L (ref 7–16)
AST SERPL-CCNC: 12 U/L (ref 0–31)
BACTERIA: ABNORMAL /HPF
BASOPHILS ABSOLUTE: 0.07 E9/L (ref 0–0.2)
BASOPHILS RELATIVE PERCENT: 0.9 % (ref 0–2)
BILIRUB SERPL-MCNC: 0.4 MG/DL (ref 0–1.2)
BILIRUBIN URINE: NEGATIVE
BLOOD, URINE: ABNORMAL
BUN BLDV-MCNC: 5 MG/DL (ref 6–20)
CALCIUM SERPL-MCNC: 8.8 MG/DL (ref 8.6–10.2)
CHLORIDE BLD-SCNC: 104 MMOL/L (ref 98–107)
CLARITY: CLEAR
CO2: 22 MMOL/L (ref 22–29)
COLOR: YELLOW
CREAT SERPL-MCNC: 0.9 MG/DL (ref 0.5–1)
D DIMER: 368 NG/ML DDU
EOSINOPHILS ABSOLUTE: 0.02 E9/L (ref 0.05–0.5)
EOSINOPHILS RELATIVE PERCENT: 0.3 % (ref 0–6)
EPITHELIAL CELLS, UA: ABNORMAL /HPF
GFR AFRICAN AMERICAN: >60
GFR NON-AFRICAN AMERICAN: >60 ML/MIN/1.73
GLUCOSE BLD-MCNC: 114 MG/DL (ref 74–99)
GLUCOSE URINE: NEGATIVE MG/DL
HCG(URINE) PREGNANCY TEST: NEGATIVE
HCT VFR BLD CALC: 27.1 % (ref 34–48)
HEMOGLOBIN: 8.4 G/DL (ref 11.5–15.5)
IMMATURE GRANULOCYTES #: 0.04 E9/L
IMMATURE GRANULOCYTES %: 0.5 % (ref 0–5)
KETONES, URINE: NEGATIVE MG/DL
LEUKOCYTE ESTERASE, URINE: ABNORMAL
LYMPHOCYTES ABSOLUTE: 1.15 E9/L (ref 1.5–4)
LYMPHOCYTES RELATIVE PERCENT: 14.5 % (ref 20–42)
MAGNESIUM: 1.8 MG/DL (ref 1.6–2.6)
MCH RBC QN AUTO: 19 PG (ref 26–35)
MCHC RBC AUTO-ENTMCNC: 31 % (ref 32–34.5)
MCV RBC AUTO: 61.3 FL (ref 80–99.9)
MONOCYTES ABSOLUTE: 0.66 E9/L (ref 0.1–0.95)
MONOCYTES RELATIVE PERCENT: 8.3 % (ref 2–12)
NEUTROPHILS ABSOLUTE: 5.97 E9/L (ref 1.8–7.3)
NEUTROPHILS RELATIVE PERCENT: 75.5 % (ref 43–80)
NITRITE, URINE: NEGATIVE
PDW BLD-RTO: 18.6 FL (ref 11.5–15)
PH UA: 6 (ref 5–9)
PLATELET # BLD: 360 E9/L (ref 130–450)
PMV BLD AUTO: 9.2 FL (ref 7–12)
POTASSIUM REFLEX MAGNESIUM: 3.4 MMOL/L (ref 3.5–5)
PRO-BNP: 29 PG/ML (ref 0–125)
PROTEIN UA: NEGATIVE MG/DL
RBC # BLD: 4.42 E12/L (ref 3.5–5.5)
RBC UA: ABNORMAL /HPF (ref 0–2)
SODIUM BLD-SCNC: 137 MMOL/L (ref 132–146)
SPECIFIC GRAVITY UA: <=1.005 (ref 1–1.03)
TOTAL PROTEIN: 7.3 G/DL (ref 6.4–8.3)
TRICHOMONAS: PRESENT /HPF
TROPONIN, HIGH SENSITIVITY: 6 NG/L (ref 0–9)
UROBILINOGEN, URINE: 0.2 E.U./DL
WBC # BLD: 7.9 E9/L (ref 4.5–11.5)
WBC UA: ABNORMAL /HPF (ref 0–5)

## 2022-05-11 PROCEDURE — 83735 ASSAY OF MAGNESIUM: CPT

## 2022-05-11 PROCEDURE — 85378 FIBRIN DEGRADE SEMIQUANT: CPT

## 2022-05-11 PROCEDURE — 85025 COMPLETE CBC W/AUTO DIFF WBC: CPT

## 2022-05-11 PROCEDURE — 80053 COMPREHEN METABOLIC PANEL: CPT

## 2022-05-11 PROCEDURE — 84484 ASSAY OF TROPONIN QUANT: CPT

## 2022-05-11 PROCEDURE — 99285 EMERGENCY DEPT VISIT HI MDM: CPT

## 2022-05-11 PROCEDURE — 83880 ASSAY OF NATRIURETIC PEPTIDE: CPT

## 2022-05-11 PROCEDURE — 6360000002 HC RX W HCPCS: Performed by: STUDENT IN AN ORGANIZED HEALTH CARE EDUCATION/TRAINING PROGRAM

## 2022-05-11 PROCEDURE — 71045 X-RAY EXAM CHEST 1 VIEW: CPT

## 2022-05-11 PROCEDURE — 2580000003 HC RX 258: Performed by: STUDENT IN AN ORGANIZED HEALTH CARE EDUCATION/TRAINING PROGRAM

## 2022-05-11 PROCEDURE — 2580000003 HC RX 258: Performed by: EMERGENCY MEDICINE

## 2022-05-11 PROCEDURE — 81025 URINE PREGNANCY TEST: CPT

## 2022-05-11 PROCEDURE — 36415 COLL VENOUS BLD VENIPUNCTURE: CPT

## 2022-05-11 PROCEDURE — 81001 URINALYSIS AUTO W/SCOPE: CPT

## 2022-05-11 RX ORDER — 0.9 % SODIUM CHLORIDE 0.9 %
1000 INTRAVENOUS SOLUTION INTRAVENOUS ONCE
Status: COMPLETED | OUTPATIENT
Start: 2022-05-11 | End: 2022-05-12

## 2022-05-11 RX ORDER — CEFDINIR 300 MG/1
300 CAPSULE ORAL 2 TIMES DAILY
Qty: 6 CAPSULE | Refills: 0 | Status: SHIPPED | OUTPATIENT
Start: 2022-05-11 | End: 2022-05-14

## 2022-05-11 RX ORDER — ONDANSETRON 2 MG/ML
4 INJECTION INTRAMUSCULAR; INTRAVENOUS ONCE
Status: DISCONTINUED | OUTPATIENT
Start: 2022-05-11 | End: 2022-05-12 | Stop reason: HOSPADM

## 2022-05-11 RX ORDER — LORAZEPAM 2 MG/ML
0.5 INJECTION INTRAMUSCULAR ONCE
Status: DISCONTINUED | OUTPATIENT
Start: 2022-05-11 | End: 2022-05-12 | Stop reason: HOSPADM

## 2022-05-11 RX ORDER — ONDANSETRON 4 MG/1
8 TABLET, ORALLY DISINTEGRATING ORAL EVERY 8 HOURS PRN
Qty: 20 TABLET | Refills: 0 | Status: SHIPPED | OUTPATIENT
Start: 2022-05-11

## 2022-05-11 RX ORDER — CEFDINIR 300 MG/1
300 CAPSULE ORAL EVERY 12 HOURS SCHEDULED
Status: DISCONTINUED | OUTPATIENT
Start: 2022-05-12 | End: 2022-05-12 | Stop reason: HOSPADM

## 2022-05-11 RX ORDER — 0.9 % SODIUM CHLORIDE 0.9 %
1000 INTRAVENOUS SOLUTION INTRAVENOUS ONCE
Status: COMPLETED | OUTPATIENT
Start: 2022-05-11 | End: 2022-05-11

## 2022-05-11 RX ADMIN — SODIUM CHLORIDE 1000 ML: 9 INJECTION, SOLUTION INTRAVENOUS at 22:37

## 2022-05-11 ASSESSMENT — PAIN - FUNCTIONAL ASSESSMENT
PAIN_FUNCTIONAL_ASSESSMENT: NONE - DENIES PAIN
PAIN_FUNCTIONAL_ASSESSMENT: NONE - DENIES PAIN

## 2022-05-12 ASSESSMENT — PAIN - FUNCTIONAL ASSESSMENT: PAIN_FUNCTIONAL_ASSESSMENT: NONE - DENIES PAIN
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
## 2022-05-16 LAB
EKG ATRIAL RATE: 98 BPM
EKG P AXIS: 61 DEGREES
EKG P-R INTERVAL: 170 MS
EKG Q-T INTERVAL: 358 MS
EKG QRS DURATION: 72 MS
EKG QTC CALCULATION (BAZETT): 457 MS
EKG R AXIS: 76 DEGREES
EKG T AXIS: 48 DEGREES
EKG VENTRICULAR RATE: 98 BPM

## 2022-12-24 ENCOUNTER — HOSPITAL ENCOUNTER (EMERGENCY)
Age: 37
Discharge: HOME | End: 2022-12-24
Attending: EMERGENCY MEDICINE
Payer: COMMERCIAL

## 2022-12-24 VITALS
DIASTOLIC BLOOD PRESSURE: 77 MMHG | BODY MASS INDEX: 25.06 KG/M2 | HEART RATE: 84 BPM | SYSTOLIC BLOOD PRESSURE: 120 MMHG | WEIGHT: 160 LBS | RESPIRATION RATE: 14 BRPM | TEMPERATURE: 98.3 F | OXYGEN SATURATION: 100 %

## 2022-12-24 DIAGNOSIS — R51.9 ACUTE NONINTRACTABLE HEADACHE, UNSPECIFIED HEADACHE TYPE: Primary | ICD-10-CM

## 2022-12-24 PROCEDURE — 6370000000 HC RX 637 (ALT 250 FOR IP): Performed by: EMERGENCY MEDICINE

## 2022-12-24 PROCEDURE — 99283 EMERGENCY DEPT VISIT LOW MDM: CPT

## 2022-12-24 RX ORDER — ACETAMINOPHEN 325 MG/1
650 TABLET ORAL ONCE
Status: COMPLETED | OUTPATIENT
Start: 2022-12-24 | End: 2022-12-24

## 2022-12-24 RX ADMIN — ACETAMINOPHEN 650 MG: 325 TABLET ORAL at 13:53

## 2022-12-24 ASSESSMENT — PAIN DESCRIPTION - DESCRIPTORS
DESCRIPTORS: THROBBING
DESCRIPTORS: ACHING

## 2022-12-24 ASSESSMENT — PAIN DESCRIPTION - FREQUENCY: FREQUENCY: CONTINUOUS

## 2022-12-24 ASSESSMENT — PAIN DESCRIPTION - ONSET: ONSET: ON-GOING

## 2022-12-24 ASSESSMENT — PAIN DESCRIPTION - ORIENTATION: ORIENTATION: RIGHT

## 2022-12-24 ASSESSMENT — PAIN SCALES - GENERAL: PAINLEVEL_OUTOF10: 5

## 2022-12-24 ASSESSMENT — PAIN DESCRIPTION - PAIN TYPE: TYPE: ACUTE PAIN

## 2022-12-24 ASSESSMENT — PAIN - FUNCTIONAL ASSESSMENT: PAIN_FUNCTIONAL_ASSESSMENT: 0-10

## 2022-12-24 ASSESSMENT — PAIN DESCRIPTION - LOCATION
LOCATION: HEAD
LOCATION: HEAD

## 2022-12-24 ASSESSMENT — LIFESTYLE VARIABLES: HOW OFTEN DO YOU HAVE A DRINK CONTAINING ALCOHOL: NEVER

## 2022-12-24 NOTE — ED NOTES
Pt leaving AMA. Could not provide urine for CT scan. Did not want to wait until she could give a urine. Pt leaving AMA.  Form signed and witnessed      Shazia Kumari RN  12/24/22 [id number]
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
## (undated) DEVICE — PADDING UNDERCAST W4INXL4YD COT FBR LO LINTING WYTEX

## (undated) DEVICE — SURGICAL PROCEDURE PACK HND

## (undated) DEVICE — PADDING,UNDERCAST,COTTON, 4"X4YD STERILE: Brand: MEDLINE

## (undated) DEVICE — ARM CRADLE: Brand: DEROYAL

## (undated) DEVICE — PADDING CAST W4INXL4YD SPUN DACRON POLY POR SYN VERSATILE

## (undated) DEVICE — SPLINT FNGR MULT PURP UNIV 18X1 IN STRP PROCARE

## (undated) DEVICE — MASTISOL ADHESIVE LIQ 2/3ML

## (undated) DEVICE — SINGLE USE DEVICE INTENDED TO COVER EXPOSED ENDS OF ORTHOPEDIC PIN AND K-WIRES TO HELP PROTECT THE EXPOSED WIRE FROM SNAGGING ON CLOTHING.: Brand: OXBORO™ PIN COVER

## (undated) DEVICE — SPLINT ORTH W4XL15IN PLSTR OF PARIS LO EXOTHERM SMOOTH

## (undated) DEVICE — TOWEL,OR,DSP,ST,BLUE,STD,6/PK,12PK/CS: Brand: MEDLINE

## (undated) DEVICE — ZIMMER® STERILE DISPOSABLE TOURNIQUET CUFF WITH PROTECTIVE SLEEVE AND PLC, DUAL PORT, SINGLE BLADDER, 18 IN. (46 CM)

## (undated) DEVICE — ZIMMER® STERILE DISPOSABLE TOURNIQUET CUFF WITH PLC, DUAL PORT, SINGLE BLADDER, 18 IN. (46 CM)

## (undated) DEVICE — Device

## (undated) DEVICE — PADDING CAST W2INXL4YD COT LO LINTING WYTEX

## (undated) DEVICE — COVER HNDL LT DISP

## (undated) DEVICE — 4-PORT MANIFOLD: Brand: NEPTUNE 2

## (undated) DEVICE — BIT DRL L150MM DIA2MM ST CANN QUIK CPL NONRADIOPAQUE W/O

## (undated) DEVICE — DOUBLE BASIN SET: Brand: MEDLINE INDUSTRIES, INC.

## (undated) DEVICE — DRAPE CARM MINI FOR IMAG SYS INSIGHT FLROSCN

## (undated) DEVICE — SYRINGE MED 20ML STD CLR PLAS LUERLOCK TIP N CTRL DISP

## (undated) DEVICE — PADDING,UNDERCAST,COTTON, 3X4YD STERILE: Brand: MEDLINE

## (undated) DEVICE — GOWN,SIRUS,FABRNF,XL,20/CS: Brand: MEDLINE

## (undated) DEVICE — STERILE HOOK LOCK LATEX FREE ELASTIC BANDAGE 2INX5YD: Brand: HOOK LOCK™

## (undated) DEVICE — BNDG,ELSTC,MATRIX,STRL,2"X5YD,LF,HOOK&LP: Brand: MEDLINE

## (undated) DEVICE — GUIDEWIRE ORTH L150MM DIA1.1MM S STL THRD FOR 3MM CANN SCR

## (undated) DEVICE — DRESSING,GAUZE,XEROFORM,CURAD,1"X8",ST: Brand: CURAD

## (undated) DEVICE — STRIP,CLOSURE,WOUND,MEDI-STRIP,1/4X3: Brand: MEDLINE

## (undated) DEVICE — GUIDEWIRE ORTH L150MM DIA1.1MM S STL NTHRD FOR 3MM CANN SCR

## (undated) DEVICE — SOLUTION IV IRRIG POUR BRL 0.9% SODIUM CHL 2F7124

## (undated) DEVICE — GAUZE,SPONGE,4"X4",8PLY,STRL,LF,10/TRAY: Brand: MEDLINE

## (undated) DEVICE — CRADLE ARM W8.75XH12.5XL16IN FOAM SUPP ELEVATION VENT

## (undated) DEVICE — SUTURE VCRL SZ 2-0 L27IN ABSRB UD L26MM SH 1/2 CIR J417H

## (undated) DEVICE — SOLUTION IV IRRIG WATER 1000ML POUR BRL 2F7114

## (undated) DEVICE — GOWN,SIRUS,FABRNF,L,20/CS: Brand: MEDLINE